# Patient Record
Sex: FEMALE | Employment: UNEMPLOYED | URBAN - METROPOLITAN AREA
[De-identification: names, ages, dates, MRNs, and addresses within clinical notes are randomized per-mention and may not be internally consistent; named-entity substitution may affect disease eponyms.]

---

## 2025-05-14 ENCOUNTER — HOSPITAL ENCOUNTER (INPATIENT)
Facility: HOSPITAL | Age: OVER 89
LOS: 4 days | Discharge: HOME HEALTH | DRG: 291 | End: 2025-05-18
Attending: STUDENT IN AN ORGANIZED HEALTH CARE EDUCATION/TRAINING PROGRAM | Admitting: INTERNAL MEDICINE
Payer: MEDICARE

## 2025-05-14 ENCOUNTER — APPOINTMENT (OUTPATIENT)
Dept: CARDIOLOGY | Facility: HOSPITAL | Age: OVER 89
DRG: 291 | End: 2025-05-14
Payer: MEDICARE

## 2025-05-14 ENCOUNTER — APPOINTMENT (OUTPATIENT)
Dept: RADIOLOGY | Facility: HOSPITAL | Age: OVER 89
DRG: 291 | End: 2025-05-14
Payer: MEDICARE

## 2025-05-14 DIAGNOSIS — I50.20 UNSPECIFIED SYSTOLIC (CONGESTIVE) HEART FAILURE: ICD-10-CM

## 2025-05-14 DIAGNOSIS — I16.0 HYPERTENSIVE URGENCY: ICD-10-CM

## 2025-05-14 DIAGNOSIS — R06.09 DYSPNEA ON EXERTION: ICD-10-CM

## 2025-05-14 DIAGNOSIS — N17.0 ACUTE KIDNEY FAILURE WITH TUBULAR NECROSIS: ICD-10-CM

## 2025-05-14 DIAGNOSIS — I50.9 ACUTE CONGESTIVE HEART FAILURE, UNSPECIFIED HEART FAILURE TYPE: Primary | ICD-10-CM

## 2025-05-14 LAB
ALBUMIN SERPL BCP-MCNC: 3.4 G/DL (ref 3.4–5)
ALP SERPL-CCNC: 95 U/L (ref 33–136)
ALT SERPL W P-5'-P-CCNC: 12 U/L (ref 7–45)
ANION GAP SERPL CALC-SCNC: 12 MMOL/L (ref 10–20)
AORTIC VALVE PEAK VELOCITY: 1.9 M/S
AST SERPL W P-5'-P-CCNC: 22 U/L (ref 9–39)
AV PEAK GRADIENT: 14 MMHG
BASE EXCESS BLDV CALC-SCNC: 3.2 MMOL/L (ref -2–3)
BASOPHILS # BLD AUTO: 0.06 X10*3/UL (ref 0–0.1)
BASOPHILS NFR BLD AUTO: 0.9 %
BILIRUB SERPL-MCNC: 0.9 MG/DL (ref 0–1.2)
BNP SERPL-MCNC: 2107 PG/ML (ref 0–99)
BODY TEMPERATURE: 37 DEGREES CELSIUS
BUN SERPL-MCNC: 13 MG/DL (ref 6–23)
CALCIUM SERPL-MCNC: 8.8 MG/DL (ref 8.6–10.3)
CARDIAC TROPONIN I PNL SERPL HS: 89 NG/L (ref 0–13)
CARDIAC TROPONIN I PNL SERPL HS: 90 NG/L (ref 0–13)
CHLORIDE SERPL-SCNC: 107 MMOL/L (ref 98–107)
CO2 SERPL-SCNC: 28 MMOL/L (ref 21–32)
CREAT SERPL-MCNC: 1.22 MG/DL (ref 0.5–1.05)
EGFRCR SERPLBLD CKD-EPI 2021: 40 ML/MIN/1.73M*2
EJECTION FRACTION APICAL 4 CHAMBER: 74.3
EJECTION FRACTION: 68 %
EOSINOPHIL # BLD AUTO: 0.03 X10*3/UL (ref 0–0.4)
EOSINOPHIL NFR BLD AUTO: 0.5 %
ERYTHROCYTE [DISTWIDTH] IN BLOOD BY AUTOMATED COUNT: 14.6 % (ref 11.5–14.5)
FLUAV RNA RESP QL NAA+PROBE: NOT DETECTED
FLUBV RNA RESP QL NAA+PROBE: NOT DETECTED
GLUCOSE SERPL-MCNC: 140 MG/DL (ref 74–99)
HCO3 BLDV-SCNC: 28.5 MMOL/L (ref 22–26)
HCT VFR BLD AUTO: 35.2 % (ref 36–46)
HGB BLD-MCNC: 11 G/DL (ref 12–16)
IMM GRANULOCYTES # BLD AUTO: 0.01 X10*3/UL (ref 0–0.5)
IMM GRANULOCYTES NFR BLD AUTO: 0.2 % (ref 0–0.9)
INHALED O2 CONCENTRATION: 21 %
LEFT ATRIUM VOLUME AREA LENGTH INDEX BSA: 72.1 ML/M2
LEFT VENTRICLE INTERNAL DIMENSION DIASTOLE: 3.58 CM (ref 3.5–6)
LYMPHOCYTES # BLD AUTO: 1.46 X10*3/UL (ref 0.8–3)
LYMPHOCYTES NFR BLD AUTO: 22.4 %
MCH RBC QN AUTO: 29.8 PG (ref 26–34)
MCHC RBC AUTO-ENTMCNC: 31.3 G/DL (ref 32–36)
MCV RBC AUTO: 95 FL (ref 80–100)
MONOCYTES # BLD AUTO: 0.44 X10*3/UL (ref 0.05–0.8)
MONOCYTES NFR BLD AUTO: 6.7 %
NEUTROPHILS # BLD AUTO: 4.53 X10*3/UL (ref 1.6–5.5)
NEUTROPHILS NFR BLD AUTO: 69.3 %
NRBC BLD-RTO: 0 /100 WBCS (ref 0–0)
OXYHGB MFR BLDV: 66.3 % (ref 45–75)
PCO2 BLDV: 45 MM HG (ref 41–51)
PH BLDV: 7.41 PH (ref 7.33–7.43)
PLATELET # BLD AUTO: 293 X10*3/UL (ref 150–450)
PO2 BLDV: 40 MM HG (ref 35–45)
POTASSIUM SERPL-SCNC: 3.5 MMOL/L (ref 3.5–5.3)
PROT SERPL-MCNC: 6.6 G/DL (ref 6.4–8.2)
RBC # BLD AUTO: 3.69 X10*6/UL (ref 4–5.2)
RIGHT VENTRICLE FREE WALL PEAK S': 14.5 CM/S
RIGHT VENTRICLE PEAK SYSTOLIC PRESSURE: 61.4 MMHG
SAO2 % BLDV: 68 % (ref 45–75)
SARS-COV-2 RNA RESP QL NAA+PROBE: NOT DETECTED
SODIUM SERPL-SCNC: 143 MMOL/L (ref 136–145)
TRICUSPID ANNULAR PLANE SYSTOLIC EXCURSION: 1.9 CM
WBC # BLD AUTO: 6.5 X10*3/UL (ref 4.4–11.3)

## 2025-05-14 PROCEDURE — 82805 BLOOD GASES W/O2 SATURATION: CPT | Performed by: PHYSICIAN ASSISTANT

## 2025-05-14 PROCEDURE — 83880 ASSAY OF NATRIURETIC PEPTIDE: CPT | Performed by: PHYSICIAN ASSISTANT

## 2025-05-14 PROCEDURE — 2500000004 HC RX 250 GENERAL PHARMACY W/ HCPCS (ALT 636 FOR OP/ED): Mod: JZ | Performed by: PHYSICIAN ASSISTANT

## 2025-05-14 PROCEDURE — 93306 TTE W/DOPPLER COMPLETE: CPT

## 2025-05-14 PROCEDURE — 2500000004 HC RX 250 GENERAL PHARMACY W/ HCPCS (ALT 636 FOR OP/ED)

## 2025-05-14 PROCEDURE — 80053 COMPREHEN METABOLIC PANEL: CPT | Performed by: PHYSICIAN ASSISTANT

## 2025-05-14 PROCEDURE — 84484 ASSAY OF TROPONIN QUANT: CPT | Performed by: PHYSICIAN ASSISTANT

## 2025-05-14 PROCEDURE — 96374 THER/PROPH/DIAG INJ IV PUSH: CPT

## 2025-05-14 PROCEDURE — 1100000001 HC PRIVATE ROOM DAILY

## 2025-05-14 PROCEDURE — 71045 X-RAY EXAM CHEST 1 VIEW: CPT | Performed by: RADIOLOGY

## 2025-05-14 PROCEDURE — 99285 EMERGENCY DEPT VISIT HI MDM: CPT | Mod: 25 | Performed by: STUDENT IN AN ORGANIZED HEALTH CARE EDUCATION/TRAINING PROGRAM

## 2025-05-14 PROCEDURE — 36415 COLL VENOUS BLD VENIPUNCTURE: CPT | Performed by: STUDENT IN AN ORGANIZED HEALTH CARE EDUCATION/TRAINING PROGRAM

## 2025-05-14 PROCEDURE — 87636 SARSCOV2 & INF A&B AMP PRB: CPT | Performed by: PHYSICIAN ASSISTANT

## 2025-05-14 PROCEDURE — 36415 COLL VENOUS BLD VENIPUNCTURE: CPT | Performed by: PHYSICIAN ASSISTANT

## 2025-05-14 PROCEDURE — 93005 ELECTROCARDIOGRAM TRACING: CPT

## 2025-05-14 PROCEDURE — 84484 ASSAY OF TROPONIN QUANT: CPT | Performed by: STUDENT IN AN ORGANIZED HEALTH CARE EDUCATION/TRAINING PROGRAM

## 2025-05-14 PROCEDURE — 2500000001 HC RX 250 WO HCPCS SELF ADMINISTERED DRUGS (ALT 637 FOR MEDICARE OP)

## 2025-05-14 PROCEDURE — 71045 X-RAY EXAM CHEST 1 VIEW: CPT

## 2025-05-14 PROCEDURE — 96375 TX/PRO/DX INJ NEW DRUG ADDON: CPT

## 2025-05-14 PROCEDURE — 2500000004 HC RX 250 GENERAL PHARMACY W/ HCPCS (ALT 636 FOR OP/ED): Performed by: STUDENT IN AN ORGANIZED HEALTH CARE EDUCATION/TRAINING PROGRAM

## 2025-05-14 PROCEDURE — 99291 CRITICAL CARE FIRST HOUR: CPT | Mod: 25 | Performed by: STUDENT IN AN ORGANIZED HEALTH CARE EDUCATION/TRAINING PROGRAM

## 2025-05-14 PROCEDURE — 85025 COMPLETE CBC W/AUTO DIFF WBC: CPT | Performed by: PHYSICIAN ASSISTANT

## 2025-05-14 PROCEDURE — 84443 ASSAY THYROID STIM HORMONE: CPT | Performed by: INTERNAL MEDICINE

## 2025-05-14 RX ORDER — FUROSEMIDE 10 MG/ML
20 INJECTION INTRAMUSCULAR; INTRAVENOUS ONCE
Status: COMPLETED | OUTPATIENT
Start: 2025-05-14 | End: 2025-05-14

## 2025-05-14 RX ORDER — HYDRALAZINE HYDROCHLORIDE 20 MG/ML
5 INJECTION INTRAMUSCULAR; INTRAVENOUS EVERY 6 HOURS PRN
Status: DISCONTINUED | OUTPATIENT
Start: 2025-05-14 | End: 2025-05-18 | Stop reason: HOSPADM

## 2025-05-14 RX ORDER — METOPROLOL TARTRATE 50 MG/1
50 TABLET ORAL 2 TIMES DAILY
Status: DISCONTINUED | OUTPATIENT
Start: 2025-05-14 | End: 2025-05-17

## 2025-05-14 RX ORDER — FUROSEMIDE 10 MG/ML
20 INJECTION INTRAMUSCULAR; INTRAVENOUS DAILY
Status: DISCONTINUED | OUTPATIENT
Start: 2025-05-15 | End: 2025-05-18 | Stop reason: HOSPADM

## 2025-05-14 RX ORDER — SIMVASTATIN 80 MG/1
80 TABLET, FILM COATED ORAL NIGHTLY
COMMUNITY
Start: 2025-02-12 | End: 2025-05-18 | Stop reason: HOSPADM

## 2025-05-14 RX ORDER — ACETAMINOPHEN 325 MG/1
650 TABLET ORAL EVERY 6 HOURS PRN
Status: DISCONTINUED | OUTPATIENT
Start: 2025-05-14 | End: 2025-05-18 | Stop reason: HOSPADM

## 2025-05-14 RX ORDER — ACETAMINOPHEN 160 MG/5ML
650 SOLUTION ORAL EVERY 6 HOURS PRN
Status: DISCONTINUED | OUTPATIENT
Start: 2025-05-14 | End: 2025-05-18 | Stop reason: HOSPADM

## 2025-05-14 RX ORDER — METOPROLOL TARTRATE 50 MG/1
50 TABLET ORAL 2 TIMES DAILY
COMMUNITY
Start: 2025-01-23 | End: 2025-05-18 | Stop reason: HOSPADM

## 2025-05-14 RX ORDER — POLYETHYLENE GLYCOL 3350 17 G/17G
17 POWDER, FOR SOLUTION ORAL DAILY
Status: DISCONTINUED | OUTPATIENT
Start: 2025-05-14 | End: 2025-05-18 | Stop reason: HOSPADM

## 2025-05-14 RX ORDER — AMLODIPINE BESYLATE 10 MG/1
10 TABLET ORAL DAILY
Status: ON HOLD | COMMUNITY
Start: 2025-03-10 | End: 2025-05-18

## 2025-05-14 RX ORDER — NETARSUDIL AND LATANOPROST OPHTHALMIC SOLUTION, 0.02%/0.005% .2; .05 MG/ML; MG/ML
1 SOLUTION/ DROPS OPHTHALMIC; TOPICAL NIGHTLY
COMMUNITY
Start: 2025-01-02 | End: 2025-05-18 | Stop reason: HOSPADM

## 2025-05-14 RX ORDER — ENOXAPARIN SODIUM 100 MG/ML
40 INJECTION SUBCUTANEOUS EVERY 24 HOURS
Status: DISCONTINUED | OUTPATIENT
Start: 2025-05-14 | End: 2025-05-14 | Stop reason: ALTCHOICE

## 2025-05-14 RX ORDER — ACETAMINOPHEN 650 MG/1
650 SUPPOSITORY RECTAL EVERY 6 HOURS PRN
Status: DISCONTINUED | OUTPATIENT
Start: 2025-05-14 | End: 2025-05-18 | Stop reason: HOSPADM

## 2025-05-14 RX ORDER — LABETALOL HYDROCHLORIDE 5 MG/ML
20 INJECTION, SOLUTION INTRAVENOUS ONCE
Status: COMPLETED | OUTPATIENT
Start: 2025-05-14 | End: 2025-05-14

## 2025-05-14 RX ORDER — HEPARIN SODIUM 5000 [USP'U]/ML
5000 INJECTION, SOLUTION INTRAVENOUS; SUBCUTANEOUS EVERY 12 HOURS
Status: DISCONTINUED | OUTPATIENT
Start: 2025-05-14 | End: 2025-05-18 | Stop reason: HOSPADM

## 2025-05-14 RX ORDER — ZOLPIDEM TARTRATE 10 MG/1
1 TABLET ORAL NIGHTLY
COMMUNITY
Start: 2024-12-18 | End: 2025-05-18 | Stop reason: HOSPADM

## 2025-05-14 RX ORDER — AMLODIPINE BESYLATE 10 MG/1
10 TABLET ORAL DAILY
Status: DISCONTINUED | OUTPATIENT
Start: 2025-05-14 | End: 2025-05-18 | Stop reason: HOSPADM

## 2025-05-14 RX ORDER — DORZOLAMIDE HYDROCHLORIDE AND TIMOLOL MALEATE 20; 5 MG/ML; MG/ML
1 SOLUTION/ DROPS OPHTHALMIC EVERY 12 HOURS
COMMUNITY
Start: 2025-01-02 | End: 2025-05-18 | Stop reason: HOSPADM

## 2025-05-14 RX ADMIN — METOPROLOL TARTRATE 50 MG: 50 TABLET, FILM COATED ORAL at 12:53

## 2025-05-14 RX ADMIN — FUROSEMIDE 20 MG: 10 INJECTION, SOLUTION INTRAVENOUS at 12:16

## 2025-05-14 RX ADMIN — AMLODIPINE BESYLATE 10 MG: 10 TABLET ORAL at 15:56

## 2025-05-14 RX ADMIN — HYDRALAZINE HYDROCHLORIDE 5 MG: 20 INJECTION INTRAMUSCULAR; INTRAVENOUS at 21:22

## 2025-05-14 RX ADMIN — HYDRALAZINE HYDROCHLORIDE 5 MG: 20 INJECTION INTRAMUSCULAR; INTRAVENOUS at 12:53

## 2025-05-14 RX ADMIN — METOPROLOL TARTRATE 50 MG: 50 TABLET, FILM COATED ORAL at 20:15

## 2025-05-14 RX ADMIN — HEPARIN SODIUM 5000 UNITS: 5000 INJECTION, SOLUTION INTRAVENOUS; SUBCUTANEOUS at 14:26

## 2025-05-14 RX ADMIN — LABETALOL HYDROCHLORIDE 20 MG: 5 INJECTION INTRAVENOUS at 11:41

## 2025-05-14 SDOH — SOCIAL STABILITY: SOCIAL INSECURITY
WITHIN THE LAST YEAR, HAVE YOU BEEN KICKED, HIT, SLAPPED, OR OTHERWISE PHYSICALLY HURT BY YOUR PARTNER OR EX-PARTNER?: NO

## 2025-05-14 SDOH — SOCIAL STABILITY: SOCIAL INSECURITY: HAVE YOU HAD THOUGHTS OF HARMING ANYONE ELSE?: NO

## 2025-05-14 SDOH — SOCIAL STABILITY: SOCIAL INSECURITY: DOES ANYONE TRY TO KEEP YOU FROM HAVING/CONTACTING OTHER FRIENDS OR DOING THINGS OUTSIDE YOUR HOME?: NO

## 2025-05-14 SDOH — SOCIAL STABILITY: SOCIAL INSECURITY
WITHIN THE LAST YEAR, HAVE YOU BEEN RAPED OR FORCED TO HAVE ANY KIND OF SEXUAL ACTIVITY BY YOUR PARTNER OR EX-PARTNER?: NO

## 2025-05-14 SDOH — ECONOMIC STABILITY: INCOME INSECURITY: IN THE PAST 12 MONTHS HAS THE ELECTRIC, GAS, OIL, OR WATER COMPANY THREATENED TO SHUT OFF SERVICES IN YOUR HOME?: NO

## 2025-05-14 SDOH — SOCIAL STABILITY: SOCIAL INSECURITY: WERE YOU ABLE TO COMPLETE ALL THE BEHAVIORAL HEALTH SCREENINGS?: YES

## 2025-05-14 SDOH — SOCIAL STABILITY: SOCIAL INSECURITY: WITHIN THE LAST YEAR, HAVE YOU BEEN AFRAID OF YOUR PARTNER OR EX-PARTNER?: NO

## 2025-05-14 SDOH — SOCIAL STABILITY: SOCIAL INSECURITY: WITHIN THE LAST YEAR, HAVE YOU BEEN HUMILIATED OR EMOTIONALLY ABUSED IN OTHER WAYS BY YOUR PARTNER OR EX-PARTNER?: NO

## 2025-05-14 SDOH — ECONOMIC STABILITY: FOOD INSECURITY: WITHIN THE PAST 12 MONTHS, THE FOOD YOU BOUGHT JUST DIDN'T LAST AND YOU DIDN'T HAVE MONEY TO GET MORE.: NEVER TRUE

## 2025-05-14 SDOH — SOCIAL STABILITY: SOCIAL INSECURITY: HAVE YOU HAD ANY THOUGHTS OF HARMING ANYONE ELSE?: NO

## 2025-05-14 SDOH — ECONOMIC STABILITY: FOOD INSECURITY: WITHIN THE PAST 12 MONTHS, YOU WORRIED THAT YOUR FOOD WOULD RUN OUT BEFORE YOU GOT THE MONEY TO BUY MORE.: NEVER TRUE

## 2025-05-14 SDOH — SOCIAL STABILITY: SOCIAL INSECURITY: ABUSE: ADULT

## 2025-05-14 SDOH — SOCIAL STABILITY: SOCIAL INSECURITY: DO YOU FEEL ANYONE HAS EXPLOITED OR TAKEN ADVANTAGE OF YOU FINANCIALLY OR OF YOUR PERSONAL PROPERTY?: NO

## 2025-05-14 SDOH — SOCIAL STABILITY: SOCIAL INSECURITY: DO YOU FEEL UNSAFE GOING BACK TO THE PLACE WHERE YOU ARE LIVING?: NO

## 2025-05-14 SDOH — SOCIAL STABILITY: SOCIAL INSECURITY: ARE YOU OR HAVE YOU BEEN THREATENED OR ABUSED PHYSICALLY, EMOTIONALLY, OR SEXUALLY BY ANYONE?: NO

## 2025-05-14 SDOH — SOCIAL STABILITY: SOCIAL INSECURITY: HAS ANYONE EVER THREATENED TO HURT YOUR FAMILY OR YOUR PETS?: NO

## 2025-05-14 SDOH — SOCIAL STABILITY: SOCIAL INSECURITY: ARE THERE ANY APPARENT SIGNS OF INJURIES/BEHAVIORS THAT COULD BE RELATED TO ABUSE/NEGLECT?: NO

## 2025-05-14 ASSESSMENT — ACTIVITIES OF DAILY LIVING (ADL)
BATHING: NEEDS ASSISTANCE
HEARING - LEFT EAR: DIFFICULTY WITH NOISE
DRESSING YOURSELF: NEEDS ASSISTANCE
ADEQUATE_TO_COMPLETE_ADL: YES
HEARING - RIGHT EAR: DIFFICULTY WITH NOISE
GROOMING: INDEPENDENT
JUDGMENT_ADEQUATE_SAFELY_COMPLETE_DAILY_ACTIVITIES: YES
PATIENT'S MEMORY ADEQUATE TO SAFELY COMPLETE DAILY ACTIVITIES?: YES
FEEDING YOURSELF: INDEPENDENT
WALKS IN HOME: NEEDS ASSISTANCE
TOILETING: NEEDS ASSISTANCE
LACK_OF_TRANSPORTATION: NO

## 2025-05-14 ASSESSMENT — COGNITIVE AND FUNCTIONAL STATUS - GENERAL
STANDING UP FROM CHAIR USING ARMS: A LITTLE
DAILY ACTIVITIY SCORE: 21
TOILETING: A LITTLE
MOVING FROM LYING ON BACK TO SITTING ON SIDE OF FLAT BED WITH BEDRAILS: A LITTLE
CLIMB 3 TO 5 STEPS WITH RAILING: A LOT
MOVING TO AND FROM BED TO CHAIR: A LITTLE
HELP NEEDED FOR BATHING: A LITTLE
DRESSING REGULAR LOWER BODY CLOTHING: A LITTLE
TURNING FROM BACK TO SIDE WHILE IN FLAT BAD: A LITTLE
MOBILITY SCORE: 17
PATIENT BASELINE BEDBOUND: NO
WALKING IN HOSPITAL ROOM: A LITTLE

## 2025-05-14 ASSESSMENT — COLUMBIA-SUICIDE SEVERITY RATING SCALE - C-SSRS
2. HAVE YOU ACTUALLY HAD ANY THOUGHTS OF KILLING YOURSELF?: NO
1. IN THE PAST MONTH, HAVE YOU WISHED YOU WERE DEAD OR WISHED YOU COULD GO TO SLEEP AND NOT WAKE UP?: NO
6. HAVE YOU EVER DONE ANYTHING, STARTED TO DO ANYTHING, OR PREPARED TO DO ANYTHING TO END YOUR LIFE?: NO
6. HAVE YOU EVER DONE ANYTHING, STARTED TO DO ANYTHING, OR PREPARED TO DO ANYTHING TO END YOUR LIFE?: NO
1. IN THE PAST MONTH, HAVE YOU WISHED YOU WERE DEAD OR WISHED YOU COULD GO TO SLEEP AND NOT WAKE UP?: NO
2. HAVE YOU ACTUALLY HAD ANY THOUGHTS OF KILLING YOURSELF?: NO

## 2025-05-14 ASSESSMENT — LIFESTYLE VARIABLES
AUDIT-C TOTAL SCORE: 0
HOW OFTEN DO YOU HAVE A DRINK CONTAINING ALCOHOL: NEVER
AUDIT-C TOTAL SCORE: 0
PRESCIPTION_ABUSE_PAST_12_MONTHS: NO
SUBSTANCE_ABUSE_PAST_12_MONTHS: NO
HOW MANY STANDARD DRINKS CONTAINING ALCOHOL DO YOU HAVE ON A TYPICAL DAY: PATIENT DOES NOT DRINK
HOW OFTEN DO YOU HAVE 6 OR MORE DRINKS ON ONE OCCASION: NEVER
SKIP TO QUESTIONS 9-10: 1

## 2025-05-14 ASSESSMENT — PATIENT HEALTH QUESTIONNAIRE - PHQ9
1. LITTLE INTEREST OR PLEASURE IN DOING THINGS: NOT AT ALL
SUM OF ALL RESPONSES TO PHQ9 QUESTIONS 1 & 2: 0
2. FEELING DOWN, DEPRESSED OR HOPELESS: NOT AT ALL

## 2025-05-14 ASSESSMENT — PAIN SCALES - GENERAL
PAINLEVEL_OUTOF10: 0 - NO PAIN
PAINLEVEL_OUTOF10: 0 - NO PAIN

## 2025-05-14 ASSESSMENT — PAIN - FUNCTIONAL ASSESSMENT: PAIN_FUNCTIONAL_ASSESSMENT: 0-10

## 2025-05-14 NOTE — ED TRIAGE NOTES
Pt presents via EMS from home c/o shortness of breath for the past two months. Pt denies cough, fevers. Pt reports heaviness to sternal region of chest. EMS states pt was 90% on RA. Pt denies home O2 use. Pt 92% on 2L NC on arrival. PT /110 for EMS. PT denies headache.

## 2025-05-14 NOTE — ED PROVIDER NOTES
Limitations to History: none  External Records Reviewed  Independent Historians: self  Social determinants affecting care: none    HPI  Nae Samayoa is a 100 y.o. female with significant past medical history of hypertension who presents to the emergency department due to shortness of breath for the last 2 months.  She has not had any cough or congestion.  She denies any fever or chills.  She denies chest pains or peripheral edema.  She denies any nausea, vomiting, diarrhea.  She sometimes feels lightheaded and dizzy. She denies any headache.  She did not take her morning antihypertensives.  She lives at home with her son.  She has no further complaints.    [facility]  Medical History[1] reviewed by myself.    Meds  Current Outpatient Medications   Medication Instructions    amLODIPine (NORVASC) 10 mg, Daily    dorzolamide-timoloL (Cosopt) 22.3-6.8 mg/mL ophthalmic solution 1 drop, Every 12 hours    metoprolol tartrate (LOPRESSOR) 50 mg, oral, 2 times daily    Rocklatan 0.02-0.005 % drops 1 drop, Both Eyes, Nightly    simvastatin (ZOCOR) 80 mg, oral, Nightly    zolpidem (Ambien) 10 mg tablet 1 tablet, oral, Nightly       Allergies  RX Allergies[2] reviewed by myself.    SHx  Social History[3] reviewed by myself.      ------------------------------------------------------------------------------------------------------------------------------------------    BP (!) 192/104 (BP Location: Left arm, Patient Position: Sitting)   Pulse 74   Temp 36.2 °C (97.2 °F) (Tympanic)   Resp 20   Wt 45.4 kg (100 lb)   SpO2 95%     Physical Exam  Vitals and nursing note reviewed.   Constitutional:       General: She is not in acute distress.     Appearance: Normal appearance. She is well-developed and normal weight. She is not ill-appearing or toxic-appearing.   HENT:      Head: Normocephalic.      Nose: Nose normal.      Mouth/Throat:      Mouth: Mucous membranes are moist.   Eyes:      Extraocular Movements: Extraocular movements intact.       Conjunctiva/sclera: Conjunctivae normal.   Cardiovascular:      Rate and Rhythm: Normal rate and regular rhythm.   Pulmonary:      Effort: Pulmonary effort is normal.      Breath sounds: Decreased breath sounds present.   Abdominal:      General: Abdomen is flat.      Palpations: Abdomen is soft.      Tenderness: There is no abdominal tenderness. There is no guarding or rebound.   Musculoskeletal:         General: Normal range of motion.      Cervical back: Neck supple.      Right lower leg: No edema.      Left lower leg: No edema.   Skin:     General: Skin is warm and dry.   Neurological:      Mental Status: She is alert and oriented to person, place, and time.   Psychiatric:         Attention and Perception: Attention normal.         Mood and Affect: Mood normal.          ------------------------------------------------------------------------------------------------------------------------------------------  Labs  Labs Reviewed   CBC WITH AUTO DIFFERENTIAL - Abnormal       Result Value    WBC 6.5      nRBC 0.0      RBC 3.69 (*)     Hemoglobin 11.0 (*)     Hematocrit 35.2 (*)     MCV 95      MCH 29.8      MCHC 31.3 (*)     RDW 14.6 (*)     Platelets 293      Neutrophils % 69.3      Immature Granulocytes %, Automated 0.2      Lymphocytes % 22.4      Monocytes % 6.7      Eosinophils % 0.5      Basophils % 0.9      Neutrophils Absolute 4.53      Immature Granulocytes Absolute, Automated 0.01      Lymphocytes Absolute 1.46      Monocytes Absolute 0.44      Eosinophils Absolute 0.03      Basophils Absolute 0.06     COMPREHENSIVE METABOLIC PANEL - Abnormal    Glucose 140 (*)     Sodium 143      Potassium 3.5      Chloride 107      Bicarbonate 28      Anion Gap 12      Urea Nitrogen 13      Creatinine 1.22 (*)     eGFR 40 (*)     Calcium 8.8      Albumin 3.4      Alkaline Phosphatase 95      Total Protein 6.6      AST 22      Bilirubin, Total 0.9      ALT 12     TROPONIN I, HIGH SENSITIVITY - Abnormal    Troponin I,  High Sensitivity 89 (*)     Narrative:     Less than 99th percentile of normal range cutoff-  Female and children under 18 years old <14 ng/L; Male <21 ng/L: Negative  Repeat testing should be performed if clinically indicated.     Female and children under 18 years old 14-50 ng/L; Male 21-50 ng/L:  Consistent with possible cardiac damage and possible increased clinical   risk. Serial measurements may help to assess extent of myocardial damage.     >50 ng/L: Consistent with cardiac damage, increased clinical risk and  myocardial infarction. Serial measurements may help assess extent of   myocardial damage.      NOTE: Children less than 1 year old may have higher baseline troponin   levels and results should be interpreted in conjunction with the overall   clinical context.     NOTE: Troponin I testing is performed using a different   testing methodology at [facility] than at other   [facility] hospitals. Direct result comparisons should only   be made within the same method.   B-TYPE NATRIURETIC PEPTIDE - Abnormal    BNP 2,107 (*)     Narrative:        <100 pg/mL - Heart failure unlikely  100-299 pg/mL - Intermediate probability of acute heart                  failure exacerbation. Correlate with clinical                  context and patient history.    >=300 pg/mL - Heart Failure likely. Correlate with clinical                  context and patient history.    BNP testing is performed using different testing methodology at [facility] than at other [facility]. Direct result comparisons should only be made within the same method.      BLOOD GAS VENOUS - Abnormal    POCT pH, Venous 7.41      POCT pCO2, Venous 45      POCT pO2, Venous 40      POCT SO2, Venous 68      POCT Oxy Hemoglobin, Venous 66.3      POCT Base Excess, Venous 3.2 (*)     POCT HCO3 Calculated, Venous 28.5 (*)     Patient Temperature 37.0      FiO2 21     TROPONIN I, HIGH SENSITIVITY - Abnormal    Troponin I, High  Sensitivity 90 (*)     Narrative:     Less than 99th percentile of normal range cutoff-  Female and children under 18 years old <14 ng/L; Male <21 ng/L: Negative  Repeat testing should be performed if clinically indicated.     Female and children under 18 years old 14-50 ng/L; Male 21-50 ng/L:  Consistent with possible cardiac damage and possible increased clinical   risk. Serial measurements may help to assess extent of myocardial damage.     >50 ng/L: Consistent with cardiac damage, increased clinical risk and  myocardial infarction. Serial measurements may help assess extent of   myocardial damage.      NOTE: Children less than 1 year old may have higher baseline troponin   levels and results should be interpreted in conjunction with the overall   clinical context.     NOTE: Troponin I testing is performed using a different   testing methodology at [facility] than at other   [facility]. Direct result comparisons should only   be made within the same method.   SARS-COV-2 AND INFLUENZA A/B PCR - Normal    Flu A Result Not Detected      Flu B Result Not Detected      Coronavirus 2019, PCR Not Detected      Narrative:     This assay is an FDA-cleared, in vitro diagnostic nucleic acid amplification test for the qualitative detection and differentiation of SARS CoV-2/ Influenza A/B from nasopharyngeal specimens collected from individuals with signs and symptoms of respiratory tract infections, and has been validated for use at [facility]. Negative results do not preclude COVID-19/ Influenza A/B infections and should not be used as the sole basis for diagnosis, treatment, or other management decisions. Testing for SARS CoV-2 is recommended only for patients who meet current clinical and/or epidemiological criteria defined by federal, state, or local public health directives.        Imaging  XR chest 1 view   Final Result   Patchy bilateral perihilar and basilar infiltrates and  probable small   effusions.        MACRO:   none        Signed by: Jan Michaels 5/14/2025 10:29 AM   Dictation workstation:   [id number]           ED Course  Diagnoses as of 05/14/25 1246   Acute congestive heart failure, unspecified heart failure type   Hypertensive urgency        Medical Decision Making: She did not appear ill or toxic.  Vital signs reviewed.  She is hypertensive.  She is 90% on room air.  Nursing staff placed her on 2 L of oxygen via nasal cannula and improved to oxygenation to 94%.  She was placed in continuous cardiac and pulse ox monitor.  Comprehensive workup initiated for her shortness of breath.  Nursing staff initially having a hard time getting blood pressure readings on her arms and legs.  Will have her continue to monitor this and do manual blood pressure.    Differential diagnoses considered: Congestive heart failure, pneumonia, pleural effusions, ACS, pulmonary hypertension, others    Medications given: IV labetalol, IV Lasix    EKG interpreted by myself and ED attending: Normal sinus rhythm.  Ventricular rate 86 bpm.  No acute ST elevations.  T wave inversions in the inferior leads.  Slight ST sloping in V4 through V6.    Manual blood pressure 262 over 110 mmHg.  Will treat with IV labetalol    I reviewed the workup from today.  No leukocytosis or leukopenia.  Hemoglobin 11 with hematocrit of 35.2.  Platelets normal.  BUN 13 with creatinine 1.22.  VBG showing a normal pH.  COVID influenza negative.  BNP elevated to 107.  Troponin 89.  Second troponin 90.  Chest x-ray showing patchy bilateral perihilar and basilar infiltrates and probable small effusions.  Patient will be diuresed with IV Lasix.  Blood pressure has improved to 192/104.  She will need to be admitted.  I consulted her PCP.  Dr. Winter is covering.  I spoke with Dr. Winter who will admit.  House VERONICA notified.  Case discussed and evaluated with the attending who is agreeable to patient plan of care.    Diagnosis:  Congestive heart failure, hypertensive urgency  Plan: Admit       [1] No past medical history on file.  [2] No Known Allergies  [3]         Raman Brooks PA-C  05/14/25 [id number]

## 2025-05-14 NOTE — H&P
History Of Present Illness  Nae Samayoa is a 100 y.o. female with past medical history of hypertension who presents to the emergency department for evaluation of shortness of breath. Patient is alert and oriented x 3 at the time my interview.  She states that she has been experiencing shortness of breath that is, gradually since January.  She states that it is worse at night when she is lying flat.  She states that occasionally she will wake up in the middle of the night gasping for air.  Denies use of pillows to prop her up or sleeping in recliner.  She also states that she has had decreased appetite.  Patient otherwise denies any complaints, denies fever/chills, URI symptoms, sick contacts, history of DVT/PE, tobacco use, alcohol use, recent travel/recent surgery, leg swelling, chest pain, abdominal pain, nausea/vomiting, urinary symptoms.  She does not use oxygen at home.  Patient states she lives at home with her son and does not use any assistive ambulatory devices.  She states she has been taking her medicines every day, but did not take her antihypertensives this morning.  She states she does not take her blood pressure at home.  Patient and I have a lengthy discussion regarding CODE STATUS.  I explained in detail to patient full code, DNR CCA and DNRCC.  Patient expresses understanding and would like to be made DNR CC.    ED course: On arrival, patient's /133, heart rate 89, respirations 18, afebrile, saturating 94% on 2 L NC.  Patient's BP now down to 192/104 after labetalol.  Labs and imaging performed, revealing glucose 140, creatinine 1.22 and BUN 13 (1.29 and 33 on 10/3/2022).  LFTs WNL.  BNP elevated at 2,107.  Initial troponin 89.  No white count.  Hemoglobin decreased at 11.  Platelets WNL.  Flu A/B/COVID-19 swabs negative.  VBG shows normal pH 7.41 with normal pCO2 at 45, and slightly elevated HCO3 at 28.5.  Chest x-ray shows patchy bilateral perihilar and basilar infiltrates and probable small  effusions.  EKG, per ED physician, normal sinus rhythm with ventricular rate 86 bpm.  No acute ST elevations.  T wave inversions in the inferior leads.  Slight ST sloping in V4 through V6.  Patient given IV labetalol 20 mg and 20 mg IV Lasix in the ED.  Patient will be admitted inpatient under Dr. Winter.     Past Medical History  Medical History[1]    Surgical History  Surgical History[2]     Social History  She has no history on file for tobacco use, alcohol use, and drug use.    Family History  Family History[3]     Allergies  Patient has no known allergies.    Review of Systems  Negative except as stated above in HPI.    Physical Exam  Constitutional:       Comments: Patient is asleep when I arrive, but easily arousable.  Alert and oriented x 3.   HENT:      Head: Normocephalic and atraumatic.      Nose: Nose normal.      Mouth/Throat:      Mouth: Mucous membranes are dry.      Pharynx: Oropharynx is clear.   Eyes:      Extraocular Movements: Extraocular movements intact.      Conjunctiva/sclera: Conjunctivae normal.      Pupils: Pupils are equal, round, and reactive to light.   Cardiovascular:      Rate and Rhythm: Normal rate and regular rhythm.      Pulses: Normal pulses.   Pulmonary:      Effort: Pulmonary effort is normal.      Breath sounds: Normal breath sounds. No wheezing or rales.   Abdominal:      General: Abdomen is flat. Bowel sounds are normal.      Palpations: Abdomen is soft.      Tenderness: There is no abdominal tenderness.   Musculoskeletal:         General: Normal range of motion.      Right lower leg: Edema (1+) present.      Left lower leg: Edema (1+) present.   Skin:     General: Skin is warm and dry.   Neurological:      General: No focal deficit present.      Mental Status: She is alert and oriented to person, place, and time.   Psychiatric:         Mood and Affect: Mood normal.         Behavior: Behavior normal.         Thought Content: Thought content normal.          Last Recorded  Vitals  Blood pressure (!) 221/94, pulse 78, temperature 36.2 °C (97.2 °F), resp. rate 18, weight 45.4 kg (100 lb), SpO2 98%.    Relevant Results    Scheduled medications  Scheduled Medications[4]  Continuous medications  Continuous Medications[5]  PRN medications  PRN Medications[6]    Results for orders placed or performed during the hospital encounter of 05/14/25 (from the past 24 hours)   Sars-CoV-2 and Influenza A/B PCR   Result Value Ref Range    Flu A Result Not Detected Not Detected    Flu B Result Not Detected Not Detected    Coronavirus 2019, PCR Not Detected Not Detected   CBC and Auto Differential   Result Value Ref Range    WBC 6.5 4.4 - 11.3 x10*3/uL    nRBC 0.0 0.0 - 0.0 /100 WBCs    RBC 3.69 (L) 4.00 - 5.20 x10*6/uL    Hemoglobin 11.0 (L) 12.0 - 16.0 g/dL    Hematocrit 35.2 (L) 36.0 - 46.0 %    MCV 95 80 - 100 fL    MCH 29.8 26.0 - 34.0 pg    MCHC 31.3 (L) 32.0 - 36.0 g/dL    RDW 14.6 (H) 11.5 - 14.5 %    Platelets 293 150 - 450 x10*3/uL    Neutrophils % 69.3 40.0 - 80.0 %    Immature Granulocytes %, Automated 0.2 0.0 - 0.9 %    Lymphocytes % 22.4 13.0 - 44.0 %    Monocytes % 6.7 2.0 - 10.0 %    Eosinophils % 0.5 0.0 - 6.0 %    Basophils % 0.9 0.0 - 2.0 %    Neutrophils Absolute 4.53 1.60 - 5.50 x10*3/uL    Immature Granulocytes Absolute, Automated 0.01 0.00 - 0.50 x10*3/uL    Lymphocytes Absolute 1.46 0.80 - 3.00 x10*3/uL    Monocytes Absolute 0.44 0.05 - 0.80 x10*3/uL    Eosinophils Absolute 0.03 0.00 - 0.40 x10*3/uL    Basophils Absolute 0.06 0.00 - 0.10 x10*3/uL   Comprehensive metabolic panel   Result Value Ref Range    Glucose 140 (H) 74 - 99 mg/dL    Sodium 143 136 - 145 mmol/L    Potassium 3.5 3.5 - 5.3 mmol/L    Chloride 107 98 - 107 mmol/L    Bicarbonate 28 21 - 32 mmol/L    Anion Gap 12 10 - 20 mmol/L    Urea Nitrogen 13 6 - 23 mg/dL    Creatinine 1.22 (H) 0.50 - 1.05 mg/dL    eGFR 40 (L) >60 mL/min/1.73m*2    Calcium 8.8 8.6 - 10.3 mg/dL    Albumin 3.4 3.4 - 5.0 g/dL    Alkaline  Phosphatase 95 33 - 136 U/L    Total Protein 6.6 6.4 - 8.2 g/dL    AST 22 9 - 39 U/L    Bilirubin, Total 0.9 0.0 - 1.2 mg/dL    ALT 12 7 - 45 U/L   Troponin I, High Sensitivity   Result Value Ref Range    Troponin I, High Sensitivity 89 (HH) 0 - 13 ng/L   B-Type Natriuretic Peptide   Result Value Ref Range    BNP 2,107 (H) 0 - 99 pg/mL   Blood Gas Venous   Result Value Ref Range    POCT pH, Venous 7.41 7.33 - 7.43 pH    POCT pCO2, Venous 45 41 - 51 mm Hg    POCT pO2, Venous 40 35 - 45 mm Hg    POCT SO2, Venous 68 45 - 75 %    POCT Oxy Hemoglobin, Venous 66.3 45.0 - 75.0 %    POCT Base Excess, Venous 3.2 (H) -2.0 - 3.0 mmol/L    POCT HCO3 Calculated, Venous 28.5 (H) 22.0 - 26.0 mmol/L    Patient Temperature 37.0 degrees Celsius    FiO2 21 %   Troponin I, High Sensitivity   Result Value Ref Range    Troponin I, High Sensitivity 90 (HH) 0 - 13 ng/L   Transthoracic Echo Complete   Result Value Ref Range    AV pk camacho 1.90 m/s    LA vol index A/L 72.1 ml/m2    Tricuspid annular plane systolic excursion 1.9 cm    LV EF 68 %    RV free wall pk S' 14.50 cm/s    LVIDd 3.58 cm    RVSP 61.4 mmHg    AV pk grad 14 mmHg    LV A4C EF 74.3      Transthoracic Echo Complete  Result Date: 5/14/2025   [facility], [address] [place] [address]           Tel [phone] and Fax [phone] TRANSTHORACIC ECHOCARDIOGRAM REPORT  Patient Name:       OLIVIA KITA          Mateo Physician:    [id number] Michael Fung MD Study Date:         5/14/2025           Ordering Provider:    [id number] BERNAL MRN/PID:            [id number]            Fellow: Accession#:         [id number]        Nurse: Date of Birth/Age:  12/26/1924 / 100    Sonographer:          Teresa faustin RDCS Gender assigned at  F                   Additional Staff: Birth: Height:                                  Admit Date:           5/14/2025 Weight:             45.36 kg            Admission Status:     Inpatient -                                                               Routine BSA / BMI:          1.27 m2 / kg/m2     Encounter#:           [id number] Blood Pressure:     258/105 mmHg        Department Location:  [facility] Study Type:    TRANSTHORACIC ECHO (TTE) COMPLETE Diagnosis/ICD: Unspecified systolic (congestive) heart failure (CHF)-I50.20 Indication:    SOB CPT Code:      Echo Complete w Full Doppler-[id number] Patient History: Pertinent History: HTN. Study Detail: The following Echo studies were performed: 2D, M-Mode, Doppler and               color flow. Technically challenging study due to patient lying in               supine position.  PHYSICIAN INTERPRETATION: Left Ventricle: Left ventricular ejection fraction is normal, by visual estimate at 65-70%. There is severely increased concentric left ventricular hypertrophy. There are no regional left ventricular wall motion abnormalities. The left ventricular cavity size is normal. Left ventricular diastolic filling is indeterminate. Left Atrium: The left atrial size is moderately dilated. Right Ventricle: The right ventricle is normal in size. There is normal right ventricular global systolic function. Right Atrium: The right atrium is normal in size. Aortic Valve: The aortic valve is trileaflet. There is mild aortic valve cusp calcification. There is trace aortic valve regurgitation. The peak instantaneous gradient of the aortic valve is 14 mmHg. Mitral Valve: The mitral valve is normal in structure. There is mild mitral annular calcification. There is moderate mitral valve regurgitation. Tricuspid Valve: The tricuspid valve is structurally normal. There is moderate tricuspid regurgitation. The Doppler estimated RVSP is moderate to severely elevated at 61.4 mmHg. Pulmonic Valve: The pulmonic valve is not well visualized. The pulmonic  valve regurgitation was not well visualized. Pericardium: There is no pericardial effusion noted. Aorta: The aortic root is normal.  CONCLUSIONS:  1. There is severely increased concentric left ventricular hypertrophy.  2. Left ventricular ejection fraction is normal, by visual estimate at 65-70%.  3. Moderate mitral valve regurgitation.  4. Moderate tricuspid regurgitation visualized.  5. Moderate to severely elevated right ventricular systolic pressure. QUANTITATIVE DATA SUMMARY:  2D MEASUREMENTS:          Normal Ranges: Ao Root d:       2.80 cm  (2.0-3.7cm) LAs:             4.50 cm  (2.7-4.0cm) IVSd:            1.92 cm  (0.6-1.1cm) LVPWd:           1.88 cm  (0.6-1.1cm) LVIDd:           3.58 cm  (3.9-5.9cm) LVIDs:           2.57 cm LV Mass Index:   233 g/m2 LVEDV Index:     29 ml/m2 LV % FS          28.2 %  LEFT ATRIUM:                  Normal Ranges: LA Vol A4C:        88.4 ml    (22+/-6mL/m2) LA Vol A2C:        89.3 ml LA Vol BP:         91.7 ml LA Vol Index A4C:  69.5ml/m2 LA Vol Index A2C:  70.2 ml/m2 LA Vol Index BP:   72.1 ml/m2 LA Area A4C:       25.8 cm2 LA Area A2C:       25.1 cm2 LA Major Axis A4C: 6.4 cm LA Major Axis A2C: 6.0 cm LA Vol A4C:        86.1 ml LA Vol A2C:        80.8 ml LA Vol Index BSA:  65.6 ml/m2  RIGHT ATRIUM:          Normal Ranges: RA Area A4C:  12.3 cm2  M-MODE MEASUREMENTS:         Normal Ranges: Ao Root:             3.00 cm (2.0-3.7cm) LAs:                 5.10 cm (2.7-4.0cm)  AORTA MEASUREMENTS:         Normal Ranges: Asc Ao, d:          2.90 cm (2.1-3.4cm)  LV SYSTOLIC FUNCTION:                      Normal Ranges: EF-A4C View:    74 % (>=55%) EF-A2C View:    82 % EF-Biplane:     81 % EF-Visual:      68 % LV EF Reported: 68 %  LV DIASTOLIC FUNCTION:           Normal Ranges: MV Peak A:             0.61 m/s  (0.42-0.7 m/s) MV e'                  0.061 m/s (>8.0) MV lateral e'          0.06 m/s MV medial e'           0.06 m/s  MITRAL VALVE:          Normal Ranges: MV DT:        156  msec (150-240msec)  MITRAL INSUFFICIENCY:             Normal Ranges: MR Vmax:              599.50 cm/s  AORTIC VALVE:           Normal Ranges: AoV Vmax:     1.90 m/s  (<=1.7m/s) AoV Peak P.4 mmHg (<20mmHg) LVOT Max Delta: 0.92 m/s  (<=1.1m/s) LVOT VTI:     15.50 cm  RIGHT VENTRICLE: RV Basal 3.06 cm RV Mid   1.89 cm RV Major 5.4 cm TAPSE:   19.1 mm RV s'    0.14 m/s  TRICUSPID VALVE/RVSP:          Normal Ranges: Peak TR Velocity:     3.82 m/s RV Syst Pressure:     61 mmHg  (< 30mmHg)  [id number] Michael Fung MD Electronically signed on 2025 at 2:56:22 PM  ** Final **     XR chest 1 view  Result Date: 2025  Interpreted By:  Jan Michaels, STUDY: XR CHEST 1 VIEW; 2025 10:20 am   INDICATION: CLINICAL INFORMATION: Signs/Symptoms:sob.   COMPARISON: None   ACCESSION NUMBER(S): [id number]   ORDERING CLINICIAN: DEQUAN PRIDE   TECHNIQUE: Portable chest one view.   FINDINGS: The cardiac size is indeterminate in view of the AP projection. Patchy bilateral perihilar and basilar infiltrates are present along with suspicious small effusions.       Patchy bilateral perihilar and basilar infiltrates and probable small effusions.   MACRO: none   Signed by: Jan Michaels 2025 10:29 AM Dictation workstation:   [id number]       Assessment & Plan  Acute congestive heart failure, unspecified heart failure type      New onset congestive heart failure  Shortness of breath  Elevated BNP  Elevated high-sensitivity troponins  Hypertensive urgency  - Cardiology consult, recommendations appreciated  - Chest x-ray shows patchy bilateral perihilar and basilar infiltrates and probable small effusions  - Initial troponin 89, delta ordered and pending.  Trend  - EKG, per ED physician, normal sinus rhythm with ventricular rate 86 bpm.  No acute ST elevations.  T wave inversions in the inferior leads.  Slight ST sloping in V4 through V6  - Patient hypertensive to 253/133 in the ED, states that she has been taking her home  metoprolol every day, though did miss her dose this a.m.   -Given IV labetalol in the ED  - Continue home metoprolol, IV hydralazine as needed  - Continue with IV Lasix 20mg daily, adjustments per attending/cardio, pt is diuretic naïve   - Echo added on  - I's&O's, daily weights, heart failure education    Elevated Cr  Anemia  - Unclear baseline, Cr 1.22 today  - Hgb 11.0 today  - Monitor with BMP/CBC    DVT Prophylaxis  - subQ heparin  - SCDs    I spent 60 minutes in the professional and overall care of this patient.      Johny Winter DO         [1] No past medical history on file.  [2] No past surgical history on file.  [3] No family history on file.  [4] amLODIPine, 10 mg, oral, Daily  [START ON 5/15/2025] furosemide, 20 mg, intravenous, Daily  heparin, 5,000 Units, subcutaneous, q12h  metoprolol tartrate, 50 mg, oral, BID  perflutren lipid microspheres, 0.5-10 mL of dilution, intravenous, Once in imaging  perflutren protein A microsphere, 0.5 mL, intravenous, Once in imaging  polyethylene glycol, 17 g, oral, Daily  sulfur hexafluoride microsphr, 2 mL, intravenous, Once in imaging     [5]    [6] PRN medications: acetaminophen **OR** acetaminophen **OR** acetaminophen, hydrALAZINE, oxygen

## 2025-05-14 NOTE — H&P
History Of Present Illness  Nae Samayoa is a 100 y.o. female with past medical history of hypertension who presents to the emergency department for evaluation of shortness of breath. Patient is alert and oriented x 3 at the time my interview.  She states that she has been experiencing shortness of breath that is, gradually since January.  She states that it is worse at night when she is lying flat.  She states that occasionally she will wake up in the middle of the night gasping for air.  Denies use of pillows to prop her up or sleeping in recliner.  She also states that she has had decreased appetite.  Patient otherwise denies any complaints, denies fever/chills, URI symptoms, sick contacts, history of DVT/PE, tobacco use, alcohol use, recent travel/recent surgery, leg swelling, chest pain, abdominal pain, nausea/vomiting, urinary symptoms.  She does not use oxygen at home.  Patient states she lives at home with her son and does not use any assistive ambulatory devices.  She states she has been taking her medicines every day, but did not take her antihypertensives this morning.  She states she does not take her blood pressure at home.  Patient and I have a lengthy discussion regarding CODE STATUS.  I explained in detail to patient full code, DNR CCA and DNRCC.  Patient expresses understanding and would like to be made DNR CC.    ED course: On arrival, patient's /133, heart rate 89, respirations 18, afebrile, saturating 94% on 2 L NC.  Patient's BP now down to 192/104 after labetalol.  Labs and imaging performed, revealing glucose 140, creatinine 1.22 and BUN 13 (1.29 and 33 on 10/3/2022).  LFTs WNL.  BNP elevated at 2,107.  Initial troponin 89.  No white count.  Hemoglobin decreased at 11.  Platelets WNL.  Flu A/B/COVID-19 swabs negative.  VBG shows normal pH 7.41 with normal pCO2 at 45, and slightly elevated HCO3 at 28.5.  Chest x-ray shows patchy bilateral perihilar and basilar infiltrates and probable small  effusions.  EKG, per ED physician, normal sinus rhythm with ventricular rate 86 bpm.  No acute ST elevations.  T wave inversions in the inferior leads.  Slight ST sloping in V4 through V6.  Patient given IV labetalol 20 mg and 20 mg IV Lasix in the ED.  Patient will be admitted inpatient under Dr. Winter.     Past Medical History  Medical History[1]    Surgical History  Surgical History[2]     Social History  She has no history on file for tobacco use, alcohol use, and drug use.    Family History  Family History[3]     Allergies  Patient has no known allergies.    Review of Systems  Negative except as stated above in HPI.    Physical Exam  Constitutional:       Comments: Patient is asleep when I arrive, but easily arousable.  Alert and oriented x 3.   HENT:      Head: Normocephalic and atraumatic.      Nose: Nose normal.      Mouth/Throat:      Mouth: Mucous membranes are dry.      Pharynx: Oropharynx is clear.   Eyes:      Extraocular Movements: Extraocular movements intact.      Conjunctiva/sclera: Conjunctivae normal.      Pupils: Pupils are equal, round, and reactive to light.   Cardiovascular:      Rate and Rhythm: Normal rate and regular rhythm.      Pulses: Normal pulses.   Pulmonary:      Effort: Pulmonary effort is normal.      Breath sounds: Normal breath sounds. No wheezing or rales.   Abdominal:      General: Abdomen is flat. Bowel sounds are normal.      Palpations: Abdomen is soft.      Tenderness: There is no abdominal tenderness.   Musculoskeletal:         General: Normal range of motion.      Right lower leg: Edema (1+) present.      Left lower leg: Edema (1+) present.   Skin:     General: Skin is warm and dry.   Neurological:      General: No focal deficit present.      Mental Status: She is alert and oriented to person, place, and time.   Psychiatric:         Mood and Affect: Mood normal.         Behavior: Behavior normal.         Thought Content: Thought content normal.          Last Recorded  Vitals  Blood pressure (!) 258/105, pulse 79, temperature 36.2 °C (97.2 °F), temperature source Tympanic, resp. rate (!) 56, weight 45.4 kg (100 lb), SpO2 96%.    Relevant Results    Scheduled medications  Scheduled Medications[4]  Continuous medications  Continuous Medications[5]  PRN medications  PRN Medications[6]    Results for orders placed or performed during the hospital encounter of 05/14/25 (from the past 24 hours)   Sars-CoV-2 and Influenza A/B PCR   Result Value Ref Range    Flu A Result Not Detected Not Detected    Flu B Result Not Detected Not Detected    Coronavirus 2019, PCR Not Detected Not Detected   CBC and Auto Differential   Result Value Ref Range    WBC 6.5 4.4 - 11.3 x10*3/uL    nRBC 0.0 0.0 - 0.0 /100 WBCs    RBC 3.69 (L) 4.00 - 5.20 x10*6/uL    Hemoglobin 11.0 (L) 12.0 - 16.0 g/dL    Hematocrit 35.2 (L) 36.0 - 46.0 %    MCV 95 80 - 100 fL    MCH 29.8 26.0 - 34.0 pg    MCHC 31.3 (L) 32.0 - 36.0 g/dL    RDW 14.6 (H) 11.5 - 14.5 %    Platelets 293 150 - 450 x10*3/uL    Neutrophils % 69.3 40.0 - 80.0 %    Immature Granulocytes %, Automated 0.2 0.0 - 0.9 %    Lymphocytes % 22.4 13.0 - 44.0 %    Monocytes % 6.7 2.0 - 10.0 %    Eosinophils % 0.5 0.0 - 6.0 %    Basophils % 0.9 0.0 - 2.0 %    Neutrophils Absolute 4.53 1.60 - 5.50 x10*3/uL    Immature Granulocytes Absolute, Automated 0.01 0.00 - 0.50 x10*3/uL    Lymphocytes Absolute 1.46 0.80 - 3.00 x10*3/uL    Monocytes Absolute 0.44 0.05 - 0.80 x10*3/uL    Eosinophils Absolute 0.03 0.00 - 0.40 x10*3/uL    Basophils Absolute 0.06 0.00 - 0.10 x10*3/uL   Comprehensive metabolic panel   Result Value Ref Range    Glucose 140 (H) 74 - 99 mg/dL    Sodium 143 136 - 145 mmol/L    Potassium 3.5 3.5 - 5.3 mmol/L    Chloride 107 98 - 107 mmol/L    Bicarbonate 28 21 - 32 mmol/L    Anion Gap 12 10 - 20 mmol/L    Urea Nitrogen 13 6 - 23 mg/dL    Creatinine 1.22 (H) 0.50 - 1.05 mg/dL    eGFR 40 (L) >60 mL/min/1.73m*2    Calcium 8.8 8.6 - 10.3 mg/dL    Albumin 3.4  3.4 - 5.0 g/dL    Alkaline Phosphatase 95 33 - 136 U/L    Total Protein 6.6 6.4 - 8.2 g/dL    AST 22 9 - 39 U/L    Bilirubin, Total 0.9 0.0 - 1.2 mg/dL    ALT 12 7 - 45 U/L   Troponin I, High Sensitivity   Result Value Ref Range    Troponin I, High Sensitivity 89 (HH) 0 - 13 ng/L   B-Type Natriuretic Peptide   Result Value Ref Range    BNP 2,107 (H) 0 - 99 pg/mL   Blood Gas Venous   Result Value Ref Range    POCT pH, Venous 7.41 7.33 - 7.43 pH    POCT pCO2, Venous 45 41 - 51 mm Hg    POCT pO2, Venous 40 35 - 45 mm Hg    POCT SO2, Venous 68 45 - 75 %    POCT Oxy Hemoglobin, Venous 66.3 45.0 - 75.0 %    POCT Base Excess, Venous 3.2 (H) -2.0 - 3.0 mmol/L    POCT HCO3 Calculated, Venous 28.5 (H) 22.0 - 26.0 mmol/L    Patient Temperature 37.0 degrees Celsius    FiO2 21 %   Troponin I, High Sensitivity   Result Value Ref Range    Troponin I, High Sensitivity 90 (HH) 0 - 13 ng/L     XR chest 1 view  Result Date: 5/14/2025  Interpreted By:  Jan Michaels, STUDY: XR CHEST 1 VIEW; 5/14/2025 10:20 am   INDICATION: CLINICAL INFORMATION: Signs/Symptoms:sob.   COMPARISON: None   ACCESSION NUMBER(S): [id number]   ORDERING CLINICIAN: DEQUAN PRIDE   TECHNIQUE: Portable chest one view.   FINDINGS: The cardiac size is indeterminate in view of the AP projection. Patchy bilateral perihilar and basilar infiltrates are present along with suspicious small effusions.       Patchy bilateral perihilar and basilar infiltrates and probable small effusions.   MACRO: none   Signed by: Jan Michaels 5/14/2025 10:29 AM Dictation workstation:   [id number]       Assessment & Plan  Acute congestive heart failure, unspecified heart failure type      New onset congestive heart failure  Shortness of breath  Elevated BNP  Elevated high-sensitivity troponins  Hypertensive urgency  - Cardiology consult, recommendations appreciated  - Chest x-ray shows patchy bilateral perihilar and basilar infiltrates and probable small effusions  - Initial troponin  89, delta ordered and pending.  Trend  - EKG, per ED physician, normal sinus rhythm with ventricular rate 86 bpm.  No acute ST elevations.  T wave inversions in the inferior leads.  Slight ST sloping in V4 through V6  - Patient hypertensive to 253/133 in the ED, states that she has been taking her home metoprolol every day, though did miss her dose this a.m.   -Given IV labetalol in the ED  - Continue home metoprolol, IV hydralazine as needed  - Continue with IV Lasix 20mg daily, adjustments per attending/cardio, pt is diuretic naïve   - Echo added on  - I's&O's, daily weights, heart failure education    Elevated Cr  Anemia  - Unclear baseline, Cr 1.22 today  - Hgb 11.0 today  - Monitor with BMP/CBC    DVT Prophylaxis  - subQ heparin  - SCDs    I spent 60 minutes in the professional and overall care of this patient.      Mag Allen PA-C         [1] No past medical history on file.  [2] No past surgical history on file.  [3] No family history on file.  [4] enoxaparin, 40 mg, subcutaneous, q24h  [START ON 5/15/2025] furosemide, 20 mg, intravenous, Daily  metoprolol tartrate, 50 mg, oral, BID  perflutren lipid microspheres, 0.5-10 mL of dilution, intravenous, Once in imaging  perflutren protein A microsphere, 0.5 mL, intravenous, Once in imaging  polyethylene glycol, 17 g, oral, Daily  sulfur hexafluoride microsphr, 2 mL, intravenous, Once in imaging  [5]    [6] PRN medications: acetaminophen **OR** acetaminophen **OR** acetaminophen, hydrALAZINE, oxygen

## 2025-05-14 NOTE — PROGRESS NOTES
Pharmacy Medication History Review    Nae Samayoa is a 100 y.o. female admitted for No Principal Problem: There is no principal problem currently on the Problem List. Please update the Problem List and refresh.. Pharmacy reviewed the patient's [id number] medications and allergies for accuracy.    The list below reflectives the updated PTA list. Please review each medication in order reconciliation for additional clarification and justification.  Prior to Admission medications    Medication Sig Start Date End Date Taking? Authorizing Provider   amLODIPine (Norvasc) 10 mg tablet Take 1 tablet (10 mg) by mouth once daily.  Patient not taking: Reported on 5/14/2025 3/10/25  no Historical Provider, MD   dorzolamide-timoloL (Cosopt) 22.3-6.8 mg/mL ophthalmic solution 1 drop every 12 hours. 1/2/25  no Historical Provider, MD   metoprolol tartrate (Lopressor) 50 mg tablet Take 1 tablet by mouth 2 times a day. 1/23/25  Yes Historical Provider, MD   [place] 0.02-0.005 % drops Administer 1 drop into both eyes once daily at bedtime. 1/2/25  no Historical Provider, MD   simvastatin (Zocor) 80 mg tablet Take 1 tablet (80 mg) by mouth once daily at bedtime. 2/12/25  no Historical Provider, MD   zolpidem (Ambien) 10 mg tablet Take 1 tablet (10 mg) by mouth once daily at bedtime. 12/18/24  Yes Historical Provider, MD        The list below reflectives the updated allergy list. Please review each documented allergy for additional clarification and justification.  Allergies  Reviewed by Mag Falk RN on 5/14/2025   No Known Allergies         Below are additional concerns with the patient's PTA list.      Doretha Echevarria

## 2025-05-14 NOTE — CONSULTS
Cardiology Consult    Impression:  HFpEF.  Acute decompensated.  Uncontrolled hypertension  Hyperlipidemia  CKD 3  Plan:  Agree with resuming metoprolol.  Agree with IV Lasix  If BP remains high, Norvasc may be resumed.  Follow labs  CC  Shortness of breath  HPI:  100-year-old woman presenting to ER for evaluation of shortness of breath.  She describes a 3-month history of slowly progressive exertional shortness of breath.  She tells me 3 months ago all of her blood pressure medications were stopped.  She only takes a sleeping pill.  In the last week she has developed orthopnea.  No lower extremity swelling.  No chest pain.  No palpitations.  No presyncope or syncope.  Last night she was struggling to breathe so she came to ER.  Oxygen saturations were 94% on 2 L.  Presenting blood pressure was 240/100.  Chest x-ray showed pulmonary venous congestion.  BNP 2107.  Troponins not significantly elevated.  Creatinine 1.2.  EKG was not available for my review.  She was given IV labetalol, IV hydralazine and IV Lasix.  Breathing has improved.  Blood pressure is better.  Meds:  Scheduled medications  Scheduled Medications[1]  Continuous medications  Continuous Medications[2]  PRN medications  PRN Medications[3]    PMHx:  As listed above  Social history:  No cigarettes or alcohol  Family history:  Noncontributory  Review of systems:  See HPI  Physical exam:  Vitals:    25 1400   BP: (!) 236/102   Pulse: 78   Resp: (!) 22   Temp:    SpO2: 98%      JVP +10.  Carotid upstroke normal.  Heart sounds normal.  Apical pansystolic murmur.  Chest exam reveals decreased breath sounds at the bases.  Abdomen soft.  No edema.  EK-lead EKG not available for review.  Telemetry shows sinus rhythm.  Echo:  Severe LVH.  Hyperdynamic LV.  EF 65 to 70%.  Moderate MR.  Moderate TR.  RVSP 61  Labs:  Lab Results   Component Value Date    WBC 6.5 2025    HGB 11.0 (L) 2025    HCT 35.2 (L) 2025     2025     CHOL 167 08/03/2020    TRIG 151 (H) 08/03/2020    HDL 46.9 08/03/2020    ALT 12 05/14/2025    AST 22 05/14/2025     05/14/2025    K 3.5 05/14/2025     05/14/2025    CREATININE 1.22 (H) 05/14/2025    BUN 13 05/14/2025    CO2 28 05/14/2025    HGBA1C 6.2 08/03/2020     par        [1] [START ON 5/15/2025] furosemide, 20 mg, intravenous, Daily  heparin, 5,000 Units, subcutaneous, q12h  metoprolol tartrate, 50 mg, oral, BID  perflutren lipid microspheres, 0.5-10 mL of dilution, intravenous, Once in imaging  perflutren protein A microsphere, 0.5 mL, intravenous, Once in imaging  polyethylene glycol, 17 g, oral, Daily  sulfur hexafluoride microsphr, 2 mL, intravenous, Once in imaging  [2]    [3] PRN medications: acetaminophen **OR** acetaminophen **OR** acetaminophen, hydrALAZINE, oxygen

## 2025-05-14 NOTE — CARE PLAN
The clinical goals for the shift include maintain safety and comfort      Problem: Pain - Adult  Goal: Verbalizes/displays adequate comfort level or baseline comfort level  Outcome: Progressing     Problem: Safety - Adult  Goal: Free from fall injury  Outcome: Progressing     Problem: Discharge Planning  Goal: Discharge to home or other facility with appropriate resources  Outcome: Progressing     Problem: Chronic Conditions and Co-morbidities  Goal: Patient's chronic conditions and co-morbidity symptoms are monitored and maintained or improved  Outcome: Progressing     Problem: Nutrition  Goal: Nutrient intake appropriate for maintaining nutritional needs  Outcome: Progressing     Problem: Skin  Goal: Decreased wound size/increased tissue granulation at next dressing change  Outcome: Progressing  Goal: Participates in plan/prevention/treatment measures  Outcome: Progressing  Goal: Prevent/manage excess moisture  Outcome: Progressing  Goal: Prevent/minimize sheer/friction injuries  Outcome: Progressing  Goal: Promote/optimize nutrition  Outcome: Progressing  Goal: Promote skin healing  Outcome: Progressing     Problem: Heart Failure  Goal: Improved gas exchange this shift  Outcome: Progressing  Goal: Improved urinary output this shift  Outcome: Progressing  Goal: Reduction in peripheral edema within 24 hours  Outcome: Progressing  Goal: Report improvement of dyspnea/breathlessness this shift  Outcome: Progressing  Goal: Weight from fluid excess reduced over 2-3 days, then stabilize  Outcome: Progressing  Goal: Increase self care and/or family involvement in 24 hours  Outcome: Progressing

## 2025-05-14 NOTE — NURSING NOTE
"Heart Failure Nurse Navigator      Assessment    I met with Nae Estebanolya at the bedside    Patients Cardiologist(s): none    Patients Primary Care Provider:    1. Medical Domain  What is the patient's most recent LVEF? unkown  HFrEF (LVEF <= 40%) Quadruple therapy recommended  HFmrEF (LVEF 41-49%) Quadruple therapy recommended  HFpEF (LVEF >= 50%) Minimum recommendations: SGLT2i and MRA  Is the patient on OP GDMT for their condition?   ARNI/ACEI/ARB: No None  BB: Yes Metoprolol tartrate 50 mg BID  MRA: No None  SGLT2i: No None  Is the patient prescribed a diuretic? No  None  Does this patient have an implanted device (ie cardioMEMS, ICD, CRT-D)?  Device type:   Could this patient have advanced heart failure (Stage D heart failure)?: No   If yes, the potential markers of advanced heart failure include:     REFERENCE: Potential markers of advanced HF   Inotrope (dobutamine or milrinone) used during this admission?   LVEF<=25%?   2.   >=2 hospitalizations for ADHF in the last year?   3.   Severe symptoms of HF (fatigue, dyspnea, confusion, edema) despite medical therapy?   4.  Downtitration of GDMT as compared to home medications?   5.  Discontinuation of GDMT because of hypotension or renal intolerance?   6.  Recurrent arrhythmias (AF, VT with ICD shocks)?   7.  Cardiac cachexia (i.e., unintentional weight loss due to HF)?   8.  High-risk biomarker profile (e.g., hyponatremia [Na<135], very elevated BNP, worsening kidney function)   9.  Escalating doses of diuretics or persistent edema despite escalation     2. Mind and Emotion  Does this patient have possible cognitive impairment?: No (The Mini-Cog score )  Ask the patient to memorize these 3 words: banana, sunrise, chair  Ask the patient to draw a clock with hands pointing at \"20 minutes after 8\"  Ask the patient to recall the 3 words  Score: Add number of words recalled + clock drawing (0 points for any errors, 2 points if correct)  Interpretation: A score of 0-2 " suggests cognitive impairment is present, a score of 3-5 suggests cognitive impairment is absent  Does this patient have major depression?: No (PH-Q2 score )  Over the last 2 weeks: Little interest or pleasure in doing things? (Not at all +0, Several days +1, More than half the days +2, Nearly every day +3)  Over the last 2 weeks: Feeling down, depressed or hopeless? (Not at all +0, Several days +1, More than half the days +2, Nearly every day +3)  Score: Add points  Interpretation: A score of 3 or more suggests that major depression is likely.     3. Physical Function  Could this patient be frail?: Yes   Defined by presence of all of these: slowness, weakness, shrinking, inactivity, exhaustion  Is this patient at risk for falling?: Yes   Defined by having experienced a fall in the last 12 months.    4. Social Determinants of Health  Transportation deficits?: No   Lack of insurance?: No   Living conditions (homelessness, unstable home)?: No   Poor family/social support?: No     I provided the following heart failure education:  - Heart Failure education packet provided.  - HF signs and symptoms, heart failure zones and when to call cardiologist.   - Controlling Heart Failure at Home: medication adherence, following up with cardiologist at least once yearly, staying healthy and active, limiting sodium and fluid intake as directed by cardiologist.  - Daily Weight Education  -Low Sodium Diet Education  -Fluid Restriction Education      *Discharge Appointment Follow-up Plan:        Additional Comments: Discussed CHF signs and symptoms and when to call cardiologist. Discussed low sodium diet and foods to avoid. CHF materials left for patient and family to review.

## 2025-05-15 LAB
ANION GAP SERPL CALC-SCNC: 12 MMOL/L (ref 10–20)
BUN SERPL-MCNC: 12 MG/DL (ref 6–23)
CALCIUM SERPL-MCNC: 8.2 MG/DL (ref 8.6–10.3)
CHLORIDE SERPL-SCNC: 105 MMOL/L (ref 98–107)
CO2 SERPL-SCNC: 30 MMOL/L (ref 21–32)
CREAT SERPL-MCNC: 1.17 MG/DL (ref 0.5–1.05)
EGFRCR SERPLBLD CKD-EPI 2021: 42 ML/MIN/1.73M*2
ERYTHROCYTE [DISTWIDTH] IN BLOOD BY AUTOMATED COUNT: 14.7 % (ref 11.5–14.5)
GLUCOSE SERPL-MCNC: 103 MG/DL (ref 74–99)
HCT VFR BLD AUTO: 32.2 % (ref 36–46)
HGB BLD-MCNC: 10.1 G/DL (ref 12–16)
MCH RBC QN AUTO: 29.7 PG (ref 26–34)
MCHC RBC AUTO-ENTMCNC: 31.4 G/DL (ref 32–36)
MCV RBC AUTO: 95 FL (ref 80–100)
NRBC BLD-RTO: 0 /100 WBCS (ref 0–0)
PLATELET # BLD AUTO: 269 X10*3/UL (ref 150–450)
POTASSIUM SERPL-SCNC: 3.2 MMOL/L (ref 3.5–5.3)
RBC # BLD AUTO: 3.4 X10*6/UL (ref 4–5.2)
SODIUM SERPL-SCNC: 144 MMOL/L (ref 136–145)
TSH SERPL-ACNC: 2.02 MIU/L (ref 0.44–3.98)
WBC # BLD AUTO: 5.9 X10*3/UL (ref 4.4–11.3)

## 2025-05-15 PROCEDURE — 97165 OT EVAL LOW COMPLEX 30 MIN: CPT | Mod: GO

## 2025-05-15 PROCEDURE — 2500000002 HC RX 250 W HCPCS SELF ADMINISTERED DRUGS (ALT 637 FOR MEDICARE OP, ALT 636 FOR OP/ED): Performed by: INTERNAL MEDICINE

## 2025-05-15 PROCEDURE — 2500000004 HC RX 250 GENERAL PHARMACY W/ HCPCS (ALT 636 FOR OP/ED): Mod: JZ

## 2025-05-15 PROCEDURE — 36415 COLL VENOUS BLD VENIPUNCTURE: CPT | Performed by: INTERNAL MEDICINE

## 2025-05-15 PROCEDURE — 2500000001 HC RX 250 WO HCPCS SELF ADMINISTERED DRUGS (ALT 637 FOR MEDICARE OP): Performed by: INTERNAL MEDICINE

## 2025-05-15 PROCEDURE — 97161 PT EVAL LOW COMPLEX 20 MIN: CPT | Mod: GP

## 2025-05-15 PROCEDURE — 1100000001 HC PRIVATE ROOM DAILY

## 2025-05-15 PROCEDURE — 85027 COMPLETE CBC AUTOMATED: CPT | Performed by: INTERNAL MEDICINE

## 2025-05-15 PROCEDURE — 2500000001 HC RX 250 WO HCPCS SELF ADMINISTERED DRUGS (ALT 637 FOR MEDICARE OP)

## 2025-05-15 PROCEDURE — 80048 BASIC METABOLIC PNL TOTAL CA: CPT | Performed by: INTERNAL MEDICINE

## 2025-05-15 RX ORDER — LOSARTAN POTASSIUM 25 MG/1
25 TABLET ORAL DAILY
Status: DISCONTINUED | OUTPATIENT
Start: 2025-05-15 | End: 2025-05-16

## 2025-05-15 RX ORDER — POTASSIUM CHLORIDE 20 MEQ/1
40 TABLET, EXTENDED RELEASE ORAL ONCE
Status: COMPLETED | OUTPATIENT
Start: 2025-05-15 | End: 2025-05-15

## 2025-05-15 RX ADMIN — METOPROLOL TARTRATE 50 MG: 50 TABLET, FILM COATED ORAL at 19:46

## 2025-05-15 RX ADMIN — POLYETHYLENE GLYCOL 3350 17 G: 17 POWDER, FOR SOLUTION ORAL at 08:39

## 2025-05-15 RX ADMIN — LOSARTAN POTASSIUM 25 MG: 25 TABLET, FILM COATED ORAL at 15:14

## 2025-05-15 RX ADMIN — HEPARIN SODIUM 5000 UNITS: 5000 INJECTION, SOLUTION INTRAVENOUS; SUBCUTANEOUS at 15:12

## 2025-05-15 RX ADMIN — METOPROLOL TARTRATE 50 MG: 50 TABLET, FILM COATED ORAL at 08:40

## 2025-05-15 RX ADMIN — AMLODIPINE BESYLATE 10 MG: 10 TABLET ORAL at 08:39

## 2025-05-15 RX ADMIN — HEPARIN SODIUM 5000 UNITS: 5000 INJECTION, SOLUTION INTRAVENOUS; SUBCUTANEOUS at 02:35

## 2025-05-15 RX ADMIN — FUROSEMIDE 20 MG: 10 INJECTION, SOLUTION INTRAMUSCULAR; INTRAVENOUS at 08:39

## 2025-05-15 RX ADMIN — POTASSIUM CHLORIDE 40 MEQ: 1500 TABLET, EXTENDED RELEASE ORAL at 15:11

## 2025-05-15 RX ADMIN — HYDRALAZINE HYDROCHLORIDE 5 MG: 20 INJECTION INTRAMUSCULAR; INTRAVENOUS at 11:46

## 2025-05-15 ASSESSMENT — COGNITIVE AND FUNCTIONAL STATUS - GENERAL
TOILETING: A LITTLE
MOBILITY SCORE: 18
TOILETING: A LOT
DRESSING REGULAR LOWER BODY CLOTHING: A LOT
MOVING TO AND FROM BED TO CHAIR: A LITTLE
DRESSING REGULAR LOWER BODY CLOTHING: A LITTLE
WALKING IN HOSPITAL ROOM: A LITTLE
MOBILITY SCORE: 17
MOVING FROM LYING ON BACK TO SITTING ON SIDE OF FLAT BED WITH BEDRAILS: A LITTLE
HELP NEEDED FOR BATHING: A LITTLE
TURNING FROM BACK TO SIDE WHILE IN FLAT BAD: A LITTLE
DAILY ACTIVITIY SCORE: 17
DAILY ACTIVITIY SCORE: 21
CLIMB 3 TO 5 STEPS WITH RAILING: A LITTLE
TURNING FROM BACK TO SIDE WHILE IN FLAT BAD: A LITTLE
CLIMB 3 TO 5 STEPS WITH RAILING: A LOT
WALKING IN HOSPITAL ROOM: A LITTLE
STANDING UP FROM CHAIR USING ARMS: A LITTLE
MOVING FROM LYING ON BACK TO SITTING ON SIDE OF FLAT BED WITH BEDRAILS: A LITTLE
MOVING TO AND FROM BED TO CHAIR: A LITTLE
DRESSING REGULAR UPPER BODY CLOTHING: A LITTLE
HELP NEEDED FOR BATHING: A LOT
STANDING UP FROM CHAIR USING ARMS: A LITTLE

## 2025-05-15 ASSESSMENT — PAIN SCALES - GENERAL
PAINLEVEL_OUTOF10: 0 - NO PAIN

## 2025-05-15 ASSESSMENT — PAIN - FUNCTIONAL ASSESSMENT
PAIN_FUNCTIONAL_ASSESSMENT: 0-10
PAIN_FUNCTIONAL_ASSESSMENT: 0-10

## 2025-05-15 NOTE — ED PROCEDURE NOTE
Procedure  Critical Care    Performed by: Charly Crawford DO  Authorized by: Charly Crawford DO    Critical care provider statement:     Critical care time (minutes):  47    Critical care time was exclusive of:  Separately billable procedures and treating other patients and teaching time    Critical care was necessary to treat or prevent imminent or life-threatening deterioration of the following conditions: Hypertensive emergency.    Critical care was time spent personally by me on the following activities:  Ordering and performing treatments and interventions, ordering and review of laboratory studies, ordering and review of radiographic studies, pulse oximetry, re-evaluation of patient's condition, review of old charts, examination of patient and evaluation of patient's response to treatment    Care discussed with: admitting provider                 Charly Crawford DO  05/15/25 [id number]

## 2025-05-15 NOTE — PROGRESS NOTES
Occupational Therapy    Evaluation    Patient Name: Nae Samayoa  MRN: [id number]  Today's Date: 5/15/2025  Time Calculation  Start Time: 1113  Stop Time: 1139  Time Calculation (min): 26 min  706/706-A    Assessment  IP OT Assessment  OT Assessment: This hospitalization 5/14/2025: SOB for 2 months.  Patient would benefit from further OT to address ADL's and functional transfers/mobility due to generalized weakness.  Prognosis: Good  End of Session Communication: Bedside nurse  End of Session Patient Position: Up in chair, Alarm on; call-light within reach    Plan:  Treatment Interventions: ADL retraining, Functional transfer training, Endurance training, Patient/family training, Equipment evaluation/education, Compensatory technique education (energy conservation / diaphragmatic breathing techniques training)  OT Frequency: 3 times per week  OT Discharge Recommendations: Low intensity level of continued care  OT - OK to Discharge: Yes to next level of care when medically cleared by physician/medical team    Subjective   Current Problem:  1. Acute congestive heart failure, unspecified heart failure type  Transthoracic Echo Complete    Transthoracic Echo Complete      2. Hypertensive urgency        3. Dyspnea on exertion  Transthoracic Echo Complete    Transthoracic Echo Complete      4. Unspecified systolic (congestive) heart failure  Transthoracic Echo Complete        General:  General  Reason for Referral: ADL, safety assessment  Referred By: Mag Allen PA-C  Past Medical History Relevant to Rehab: CHF, HTN, HLD, CKD  Co-Treatment: PT  Co-Treatment Reason: Co-eval to maximize safety during mobility tasks  Prior to Session Communication: Bedside nurse who confirmed that patient is medically stable to participate in this OT session  Patient Position Received: Bed, 3 rail up, Alarm on  General Comment: cooperative, motivated, pleasant    Precautions:  Hearing/Visual Limitations: [place]  Medical Precautions: Fall  precautions, Oxygen therapy device and L/min (2 1/2 L nasal cannula)  Precautions Comment: UE IV    Pain:  Pain Assessment  Pain Assessment: 0-10  0-10 (Numeric) Pain Score: 0 - No pain    Objective   Cognition:  Overall Cognitive Status: Within Functional Limits     Home Living:  Type of Home: Apartment  Lives With:  (son)  Home Layout: One level  Bathroom Shower/Tub: Tub/shower unit (sits at bottom of tub)  Bathroom Toilet: Standard  Bathroom Equipment: Hand-held shower hose     Prior Function:  Level of [place]: Independent with ADLs and functional transfers  Ambulatory Assistance: Independent (without device)  Prior Function Comments: drives, shops; history of no falls    ADL:  LE Dressing Assistance: Moderate (estimate due to generalized weakness)  ADL Comments: To further address in OT session using assistive techniques/adaptive equipment as needed    Activity Tolerance:  Endurance: Decreased tolerance for upright activites    Bed Mobility/Transfers:  Required instructions/cues for all mobility tasks to promote safety     Bed Mobility  Bed Mobility: Yes  Bed Mobility 1  Bed Mobility 1: Supine to sitting  Level of Assistance 1: Close supervision  Bed Mobility Comments 1: HOB elevated when sat EOB  Transfers  Transfer: Yes  Transfer 1  Transfer From 1: Sit to, Stand to  Transfer to 1: Bed, Chair with arms  Transfer Level of Assistance 1: Contact guard, Minimal verbal cues    Ambulation/Gait Training:  Functional Mobility 1  Device 1: No device  Assistance 1: Minimum assistance  Comments 1: slightly unsteady, ambulated from hospital bed to chair    Sitting Balance:  Static Sitting Balance  Static Sitting-Level of Assistance: Distant supervision    Standing Balance:  Static Standing Balance  Static Standing-Level of Assistance: Contact guard    Sensation:  Light Touch: No apparent deficits    Extremities: RUE   RUE : Within Functional Limits (BUE arthritic changes noted) and LUE   LUE: Within Functional  Limits    Outcome Measures: [facility] Daily Activity  Putting on and taking off regular lower body clothing: A lot  Bathing (including washing, rinsing, drying): A lot  Putting on and taking off regular upper body clothing: A little  Toileting, which includes using toilet, bedpan or urinal: A lot  Taking care of personal grooming such as brushing teeth: None  Eating Meals: None  Daily Activity - Total Score: 17     EDUCATION:  Education Documentation  Mobility Training, taught by Popeye Mercedes OT at 5/15/2025  2:45 PM.  Learner: Patient  Readiness: Acceptance  Method: Explanation  Response: Demonstrated Understanding, Needs Reinforcement    Goals:   Encounter Problems       Encounter Problems (Active)       OT Goals       Patient will complete upper and lower body bathing/dressing; toileting with modified independence using assistive techniques/adaptive equipment as needed  (Progressing)       Start:  05/15/25    Expected End:  05/26/25            Patient will perform bed mobility and functional transfers safely and independently: bed, chair, commode using DME as needed  (Progressing)       Start:  05/15/25    Expected End:  05/26/25            Patient will tolerate standing for 5 mins. and show overall good standing balance during ADL's and functional transfers/mobility  (Progressing)       Start:  05/15/25    Expected End:  05/26/25            Patient will apply energy conservation/diaphragmatic breathing techniques to ADL's and functional transfers with minimal cues  (Progressing)       Start:  05/15/25    Expected End:  05/26/25

## 2025-05-15 NOTE — CARE PLAN
Problem: Safety - Adult  Goal: Free from fall injury  Outcome: Progressing    The clinical goals for the shift include pt to remain free from falls until end of shift

## 2025-05-15 NOTE — PROGRESS NOTES
05/15/25 1114   Discharge Planning   Living Arrangements Children   Support Systems Children   Assistance Needed independent   Type of Residence Private residence   Number of Stairs to Enter Residence 0   Number of Stairs Within Residence 0   Do you have animals or pets at home? No   Who is requesting discharge planning? Provider   Home or Post Acute Services In home services   Type of Home Care Services Home OT;Home PT   Expected Discharge Disposition Home H   Does the patient need discharge transport arranged? No   Intensity of Service   Intensity of Service 0-30 min     Met with patient at bedside. Introduced self and role as care coordinator. Demographics verified. Patient PCP is [facility]. Patient insurance is medicare and [employer]. Patient is independent with ADL's. Patient does not uses any assistive devices for ambulation. Patient denies any home going needs at this time.

## 2025-05-15 NOTE — PROGRESS NOTES
Physical Therapy    Physical Therapy Evaluation    Patient Name: Nae Samayoa  MRN: [id number]  Today's Date: 5/15/2025   Time Calculation  Start Time: 1114  Stop Time: 1129  Time Calculation (min): 15 min  706/706-A    Assessment/Plan   PT Assessment  PT Assessment Results: Decreased endurance, Impaired balance, Decreased mobility  Rehab Prognosis: Good  Evaluation/Treatment Tolerance: Patient tolerated treatment well  Strengths: Ability to acquire knowledge, Capable of completing ADLs semi/independent  Barriers to Participation: Comorbidities  End of Session Communication: Bedside nurse  Assessment Comment: Pt admitted 5/14 with SOB and found to have acute congestive heart failure. Pt demos decreased strength, endurance and balance. Recommend low intensity therapy and initial 24hr sup.  End of Session Patient Position: Up in chair, Alarm on  IP OR SWING BED PT PLAN  Inpatient or Swing Bed: Inpatient  PT Plan  Treatment/Interventions: Bed mobility, Transfer training, Gait training, Balance training, Endurance training, Strengthening, Therapeutic exercise, Therapeutic activity, Home exercise program, Positioning  PT Plan: Ongoing PT  PT Frequency: 3 times per week  PT Discharge Recommendations: Low intensity level of continued care, 24 hr supervision due to cognition  PT - OK to Discharge: Yes    Subjective     Current Problem:  1. Acute congestive heart failure, unspecified heart failure type  Transthoracic Echo Complete    Transthoracic Echo Complete      2. Hypertensive urgency        3. Dyspnea on exertion  Transthoracic Echo Complete    Transthoracic Echo Complete      4. Unspecified systolic (congestive) heart failure  Transthoracic Echo Complete        Problem List[1]    General Visit Information:  General  Reason for Referral: PT eval and treat; impaired mobility  Referred By: Mag Allen PA-C  Past Medical History Relevant to Rehab: CHF, HTN, HLD, CKD  Co-Treatment: OT  Co-Treatment Reason: to maximize  patient safety and participatoin  Prior to Session Communication: Bedside nurse  Patient Position Received: Bed, 3 rail up, Alarm on  General Comment: Pt pleasant and agreeable to therapy    Home Living:  Home Living  Type of Home: Apartment  Lives With:  (son)  Home Layout: One level  Bathroom Shower/Tub: Tub/shower unit (takes baths)  Bathroom Toilet: Standard  Bathroom Equipment: Hand-held shower hose    Prior Level of Function:  Prior Function Per Pt/Caregiver Report  Level of [place]: Independent with ADLs and functional transfers  Ambulatory Assistance: Independent (no device)  Prior Function Comments: drives, shops; history of no falls    Precautions:  Precautions  Hearing/Visual Limitations: [place]  Medical Precautions: Fall precautions (2.5 L nasal cannula)  Precautions Comment: PIV     Objective     Pain:  Pain Assessment  Pain Assessment: 0-10  0-10 (Numeric) Pain Score: 0 - No pain    Cognition:  Cognition  Overall Cognitive Status: Within Functional Limits    General Assessments:      Activity Tolerance  Endurance: Decreased tolerance for upright activites  Sensation  Light Touch: No apparent deficits  Strength  Strength Comments: BLE WFL for age     Functional Assessments:     Bed Mobility  Bed Mobility:  (completed supine to sit with supervision for safety. Denies dizziness)  Transfers  Transfer:  (demos from EOB to no device with CGA for safety. Denies dizziness)  Ambulation/Gait Training  Ambulation/Gait Training Performed:  (completed ~40'x1 with no device and CGA for safety. Demos mild unsteadiness.)     Extremity/Trunk Assessments:     RLE   RLE : Within Functional Limits  LLE   LLE : Within Functional Limits    Outcome Measures:     [facility] Basic Mobility  Turning from your back to your side while in a flat bed without using bedrails: A little  Moving from lying on your back to sitting on the side of a flat bed without using bedrails: A little  Moving to and from bed to chair (including a  wheelchair): A little  Standing up from a chair using your arms (e.g. wheelchair or bedside chair): A little  To walk in hospital room: A little  Climbing 3-5 steps with railing: A little  Basic Mobility - Total Score: 18     Goals:  Encounter Problems       Encounter Problems (Active)       PT Problem       PT Goal 1 STG - Pt will transition supine <> sitting with MOD I  (Progressing)       Start:  05/15/25    Expected End:  05/29/25            PT Goal 2 STG - Pt will transfer STS with MOD I  (Progressing)       Start:  05/15/25    Expected End:  05/29/25            PT Goal 3 STG - Pt will amb 50' using LRD with SBA  (Progressing)       Start:  05/15/25    Expected End:  05/29/25            PT Goal 4 STG - Pt will perform a B LE ther ex program of 2-3 sets of 10  (Progressing)       Start:  05/15/25    Expected End:  05/29/25               Pain - Adult            Education Documentation  Precautions, taught by Radha Garcia PT at 5/15/2025  4:25 PM.  Learner: Patient  Readiness: Acceptance  Method: Explanation  Response: Verbalizes Understanding, Needs Reinforcement    Mobility Training, taught by Radha Garcia PT at 5/15/2025  4:25 PM.  Learner: Patient  Readiness: Acceptance  Method: Explanation  Response: Verbalizes Understanding, Needs Reinforcement    Education Comments  No comments found.              [1]   Patient Active Problem List  Diagnosis    Acute congestive heart failure, unspecified heart failure type

## 2025-05-15 NOTE — PROGRESS NOTES
Cardiology Progress    Impression:  HFpEF.  Acute decompensated.  Uncontrolled hypertension  Hyperlipidemia  CKD 3  Plan:  Continue metoprolol and Norvasc for hypertension  If BP remains high, recommend adding losartan 25 daily.  Dose may be uptitrated as needed.  Continue gentle diuresis.  Follow labs.  HPI:  No problems overnight.  Breathing better.  Blood pressure has improved but remains quite high.  Meds:  Scheduled medications  Scheduled Medications[1]  Continuous medications  Continuous Medications[2]  PRN medications  PRN Medications[3]    Physical exam:  Vitals:    05/15/25 1000   BP: 174/73   Pulse:    Resp:    Temp:    SpO2:       No JVD.  Chest clear.  No edema.  EKG:  Telemetry shows sinus rhythm.  Echo:  Severe LVH.  Hyperdynamic LV function.  Moderate MR, TR.  RVSP 61.  Labs:  Lab Results   Component Value Date    WBC 5.9 05/15/2025    HGB 10.1 (L) 05/15/2025    HCT 32.2 (L) 05/15/2025     05/15/2025    CHOL 167 08/03/2020    TRIG 151 (H) 08/03/2020    HDL 46.9 08/03/2020    ALT 12 05/14/2025    AST 22 05/14/2025     05/15/2025    K 3.2 (L) 05/15/2025     05/15/2025    CREATININE 1.17 (H) 05/15/2025    BUN 12 05/15/2025    CO2 30 05/15/2025    HGBA1C 6.2 08/03/2020     par         [1] amLODIPine, 10 mg, oral, Daily  furosemide, 20 mg, intravenous, Daily  heparin, 5,000 Units, subcutaneous, q12h  metoprolol tartrate, 50 mg, oral, BID  perflutren lipid microspheres, 0.5-10 mL of dilution, intravenous, Once in imaging  perflutren protein A microsphere, 0.5 mL, intravenous, Once in imaging  polyethylene glycol, 17 g, oral, Daily  sulfur hexafluoride microsphr, 2 mL, intravenous, Once in imaging  [2]    [3] PRN medications: acetaminophen **OR** acetaminophen **OR** acetaminophen, hydrALAZINE, oxygen

## 2025-05-15 NOTE — CARE PLAN
The patient's goals for the shift include      The clinical goals for the shift include pt to remain free from falls until end of shift       Lanette

## 2025-05-16 ENCOUNTER — APPOINTMENT (OUTPATIENT)
Dept: RADIOLOGY | Facility: HOSPITAL | Age: OVER 89
DRG: 291 | End: 2025-05-16
Payer: MEDICARE

## 2025-05-16 LAB
ANION GAP SERPL CALC-SCNC: 13 MMOL/L (ref 10–20)
ATRIAL RATE: 85 BPM
BUN SERPL-MCNC: 15 MG/DL (ref 6–23)
CALCIUM SERPL-MCNC: 8.1 MG/DL (ref 8.6–10.3)
CHLORIDE SERPL-SCNC: 103 MMOL/L (ref 98–107)
CO2 SERPL-SCNC: 29 MMOL/L (ref 21–32)
CREAT SERPL-MCNC: 1.34 MG/DL (ref 0.5–1.05)
EGFRCR SERPLBLD CKD-EPI 2021: 35 ML/MIN/1.73M*2
ERYTHROCYTE [DISTWIDTH] IN BLOOD BY AUTOMATED COUNT: 15 % (ref 11.5–14.5)
GLUCOSE SERPL-MCNC: 114 MG/DL (ref 74–99)
HCT VFR BLD AUTO: 31.4 % (ref 36–46)
HGB BLD-MCNC: 9.9 G/DL (ref 12–16)
MCH RBC QN AUTO: 30.2 PG (ref 26–34)
MCHC RBC AUTO-ENTMCNC: 31.5 G/DL (ref 32–36)
MCV RBC AUTO: 96 FL (ref 80–100)
NRBC BLD-RTO: 0 /100 WBCS (ref 0–0)
P AXIS: 90 DEGREES
PLATELET # BLD AUTO: 296 X10*3/UL (ref 150–450)
POTASSIUM SERPL-SCNC: 3.8 MMOL/L (ref 3.5–5.3)
PR INTERVAL: 145 MS
Q ONSET: 249 MS
QRS COUNT: 14 BEATS
QRS DURATION: 91 MS
QT INTERVAL: 396 MS
QTC CALCULATION(BAZETT): 474 MS
QTC FREDERICIA: 446 MS
R AXIS: 81 DEGREES
RBC # BLD AUTO: 3.28 X10*6/UL (ref 4–5.2)
SODIUM SERPL-SCNC: 141 MMOL/L (ref 136–145)
T AXIS: 268 DEGREES
T OFFSET: 447 MS
VENTRICULAR RATE: 86 BPM
WBC # BLD AUTO: 6.3 X10*3/UL (ref 4.4–11.3)

## 2025-05-16 PROCEDURE — 1100000001 HC PRIVATE ROOM DAILY

## 2025-05-16 PROCEDURE — 85027 COMPLETE CBC AUTOMATED: CPT | Performed by: INTERNAL MEDICINE

## 2025-05-16 PROCEDURE — 36415 COLL VENOUS BLD VENIPUNCTURE: CPT | Performed by: INTERNAL MEDICINE

## 2025-05-16 PROCEDURE — 71045 X-RAY EXAM CHEST 1 VIEW: CPT

## 2025-05-16 PROCEDURE — 2500000004 HC RX 250 GENERAL PHARMACY W/ HCPCS (ALT 636 FOR OP/ED)

## 2025-05-16 PROCEDURE — 80048 BASIC METABOLIC PNL TOTAL CA: CPT | Performed by: INTERNAL MEDICINE

## 2025-05-16 PROCEDURE — 2500000001 HC RX 250 WO HCPCS SELF ADMINISTERED DRUGS (ALT 637 FOR MEDICARE OP)

## 2025-05-16 PROCEDURE — 2500000001 HC RX 250 WO HCPCS SELF ADMINISTERED DRUGS (ALT 637 FOR MEDICARE OP): Performed by: INTERNAL MEDICINE

## 2025-05-16 PROCEDURE — 71045 X-RAY EXAM CHEST 1 VIEW: CPT | Performed by: STUDENT IN AN ORGANIZED HEALTH CARE EDUCATION/TRAINING PROGRAM

## 2025-05-16 PROCEDURE — 2500000004 HC RX 250 GENERAL PHARMACY W/ HCPCS (ALT 636 FOR OP/ED): Mod: JZ | Performed by: NURSE PRACTITIONER

## 2025-05-16 RX ORDER — QUETIAPINE FUMARATE 25 MG/1
12.5 TABLET, FILM COATED ORAL ONCE
Status: DISCONTINUED | OUTPATIENT
Start: 2025-05-16 | End: 2025-05-18 | Stop reason: HOSPADM

## 2025-05-16 RX ORDER — HALOPERIDOL LACTATE 5 MG/ML
1 INJECTION, SOLUTION INTRAMUSCULAR ONCE
Status: COMPLETED | OUTPATIENT
Start: 2025-05-16 | End: 2025-05-16

## 2025-05-16 RX ORDER — LOSARTAN POTASSIUM 50 MG/1
50 TABLET ORAL DAILY
Status: DISCONTINUED | OUTPATIENT
Start: 2025-05-16 | End: 2025-05-16

## 2025-05-16 RX ORDER — LOSARTAN POTASSIUM 50 MG/1
100 TABLET ORAL DAILY
Status: DISCONTINUED | OUTPATIENT
Start: 2025-05-17 | End: 2025-05-18 | Stop reason: HOSPADM

## 2025-05-16 RX ORDER — HALOPERIDOL LACTATE 5 MG/ML
5 INJECTION, SOLUTION INTRAMUSCULAR ONCE
Status: CANCELLED | OUTPATIENT
Start: 2025-05-16 | End: 2025-05-16

## 2025-05-16 RX ADMIN — METOPROLOL TARTRATE 50 MG: 50 TABLET, FILM COATED ORAL at 08:54

## 2025-05-16 RX ADMIN — FUROSEMIDE 20 MG: 10 INJECTION, SOLUTION INTRAMUSCULAR; INTRAVENOUS at 08:54

## 2025-05-16 RX ADMIN — HYDRALAZINE HYDROCHLORIDE 5 MG: 20 INJECTION INTRAMUSCULAR; INTRAVENOUS at 03:38

## 2025-05-16 RX ADMIN — AMLODIPINE BESYLATE 10 MG: 10 TABLET ORAL at 08:54

## 2025-05-16 RX ADMIN — HEPARIN SODIUM 5000 UNITS: 5000 INJECTION, SOLUTION INTRAVENOUS; SUBCUTANEOUS at 01:07

## 2025-05-16 RX ADMIN — POLYETHYLENE GLYCOL 3350 17 G: 17 POWDER, FOR SOLUTION ORAL at 08:54

## 2025-05-16 RX ADMIN — HEPARIN SODIUM 5000 UNITS: 5000 INJECTION, SOLUTION INTRAVENOUS; SUBCUTANEOUS at 16:06

## 2025-05-16 RX ADMIN — HYDRALAZINE HYDROCHLORIDE 5 MG: 20 INJECTION INTRAMUSCULAR; INTRAVENOUS at 16:06

## 2025-05-16 RX ADMIN — LOSARTAN POTASSIUM 50 MG: 50 TABLET, FILM COATED ORAL at 08:54

## 2025-05-16 RX ADMIN — HALOPERIDOL LACTATE 1 MG: 5 INJECTION, SOLUTION INTRAMUSCULAR at 21:36

## 2025-05-16 ASSESSMENT — COGNITIVE AND FUNCTIONAL STATUS - GENERAL
TOILETING: A LITTLE
CLIMB 3 TO 5 STEPS WITH RAILING: A LOT
MOBILITY SCORE: 17
HELP NEEDED FOR BATHING: A LITTLE
DAILY ACTIVITIY SCORE: 21
MOVING TO AND FROM BED TO CHAIR: A LITTLE
MOVING FROM LYING ON BACK TO SITTING ON SIDE OF FLAT BED WITH BEDRAILS: A LITTLE
TURNING FROM BACK TO SIDE WHILE IN FLAT BAD: A LITTLE
STANDING UP FROM CHAIR USING ARMS: A LITTLE
DRESSING REGULAR LOWER BODY CLOTHING: A LITTLE
WALKING IN HOSPITAL ROOM: A LITTLE

## 2025-05-16 ASSESSMENT — PAIN - FUNCTIONAL ASSESSMENT: PAIN_FUNCTIONAL_ASSESSMENT: 0-10

## 2025-05-16 ASSESSMENT — PAIN SCALES - GENERAL
PAINLEVEL_OUTOF10: 0 - NO PAIN
PAINLEVEL_OUTOF10: 0 - NO PAIN

## 2025-05-16 NOTE — CARE PLAN
The patient's goals for the shift include      The clinical goals for the shift include pt to remain free from injury until end of shift

## 2025-05-16 NOTE — PROGRESS NOTES
Olivia East is a 100 y.o. female on day 1 of admission presenting with Acute congestive heart failure, unspecified heart failure type.      Subjective   No events overnight. Patient seen and examined at bedside with nursing present. She feels her breathing is improved.       Objective     Last Recorded Vitals  /79   Pulse 86   Temp 36.7 °C (98.1 °F)   Resp 18   Wt 45.4 kg (100 lb)   SpO2 96%   Intake/Output last 3 Shifts:    Intake/Output Summary (Last 24 hours) at 5/15/2025 2051  Last data filed at 5/15/2025 0908  Gross per 24 hour   Intake 388 ml   Output 650 ml   Net -262 ml       Admission Weight  Weight: 45.4 kg (100 lb) (05/14/25 0953)    Daily Weight  05/14/25 : 45.4 kg (100 lb)    Image Results  Transthoracic Echo Complete     [facility], [address] [place] [address]            Tel [phone] and Fax [phone]    TRANSTHORACIC ECHOCARDIOGRAM REPORT       Patient Name:       OLIVIA EAST          Reading Physician:    [id number] Michael Fung MD  Study Date:         5/14/2025           Ordering Provider:    [id number] ROGELIO BERNAL  MRN/PID:            [id number]            Fellow:  Accession#:         [id number]        Nurse:  Date of Birth/Age:  12/26/1924 / 100    Sonographer:          Teresa faustin RDCS  Gender assigned at  F                   Additional Staff:  Birth:  Height:                                 Admit Date:           5/14/2025  Weight:             45.36 kg            Admission Status:     Inpatient -                                                                Routine  BSA / BMI:          1.27 m2 / kg/m2     Encounter#:           [id number]  Blood Pressure:     258/105 mmHg        Department Location:  [facility]    Study Type:    TRANSTHORACIC ECHO (TTE) COMPLETE  Diagnosis/ICD:  Unspecified systolic (congestive) heart failure (CHF)-I50.20  Indication:    SOB  CPT Code:      Echo Complete w Full Doppler-[id number]    Patient History:  Pertinent History: HTN.    Study Detail: The following Echo studies were performed: 2D, M-Mode, Doppler and                color flow. Technically challenging study due to patient lying in                supine position.       PHYSICIAN INTERPRETATION:  Left Ventricle: Left ventricular ejection fraction is normal, by visual estimate at 65-70%. There is severely increased concentric left ventricular hypertrophy. There are no regional left ventricular wall motion abnormalities. The left ventricular cavity size is normal. Left ventricular diastolic filling is indeterminate.  Left Atrium: The left atrial size is moderately dilated.  Right Ventricle: The right ventricle is normal in size. There is normal right ventricular global systolic function.  Right Atrium: The right atrium is normal in size.  Aortic Valve: The aortic valve is trileaflet. There is mild aortic valve cusp calcification. There is trace aortic valve regurgitation. The peak instantaneous gradient of the aortic valve is 14 mmHg.  Mitral Valve: The mitral valve is normal in structure. There is mild mitral annular calcification. There is moderate mitral valve regurgitation.  Tricuspid Valve: The tricuspid valve is structurally normal. There is moderate tricuspid regurgitation. The Doppler estimated RVSP is moderate to severely elevated at 61.4 mmHg.  Pulmonic Valve: The pulmonic valve is not well visualized. The pulmonic valve regurgitation was not well visualized.  Pericardium: There is no pericardial effusion noted.  Aorta: The aortic root is normal.       CONCLUSIONS:   1. There is severely increased concentric left ventricular hypertrophy.   2. Left ventricular ejection fraction is normal, by visual estimate at 65-70%.   3. Moderate mitral valve regurgitation.   4. Moderate tricuspid regurgitation  visualized.   5. Moderate to severely elevated right ventricular systolic pressure.    QUANTITATIVE DATA SUMMARY:     2D MEASUREMENTS:          Normal Ranges:  Ao Root d:       2.80 cm  (2.0-3.7cm)  LAs:             4.50 cm  (2.7-4.0cm)  IVSd:            1.92 cm  (0.6-1.1cm)  LVPWd:           1.88 cm  (0.6-1.1cm)  LVIDd:           3.58 cm  (3.9-5.9cm)  LVIDs:           2.57 cm  LV Mass Index:   233 g/m2  LVEDV Index:     29 ml/m2  LV % FS          28.2 %       LEFT ATRIUM:                  Normal Ranges:  LA Vol A4C:        88.4 ml    (22+/-6mL/m2)  LA Vol A2C:        89.3 ml  LA Vol BP:         91.7 ml  LA Vol Index A4C:  69.5ml/m2  LA Vol Index A2C:  70.2 ml/m2  LA Vol Index BP:   72.1 ml/m2  LA Area A4C:       25.8 cm2  LA Area A2C:       25.1 cm2  LA Major Axis A4C: 6.4 cm  LA Major Axis A2C: 6.0 cm  LA Vol A4C:        86.1 ml  LA Vol A2C:        80.8 ml  LA Vol Index BSA:  65.6 ml/m2       RIGHT ATRIUM:          Normal Ranges:  RA Area A4C:  12.3 cm2       M-MODE MEASUREMENTS:         Normal Ranges:  Ao Root:             3.00 cm (2.0-3.7cm)  LAs:                 5.10 cm (2.7-4.0cm)       AORTA MEASUREMENTS:         Normal Ranges:  Asc Ao, d:          2.90 cm (2.1-3.4cm)       LV SYSTOLIC FUNCTION:                       Normal Ranges:  EF-A4C View:    74 % (>=55%)  EF-A2C View:    82 %  EF-Biplane:     81 %  EF-Visual:      68 %  LV EF Reported: 68 %       LV DIASTOLIC FUNCTION:           Normal Ranges:  MV Peak A:             0.61 m/s  (0.42-0.7 m/s)  MV e'                  0.061 m/s (>8.0)  MV lateral e'          0.06 m/s  MV medial e'           0.06 m/s       MITRAL VALVE:          Normal Ranges:  MV DT:        156 msec (150-240msec)       MITRAL INSUFFICIENCY:             Normal Ranges:  MR Vmax:              599.50 cm/s       AORTIC VALVE:           Normal Ranges:  AoV Vmax:     1.90 m/s  (<=1.7m/s)  AoV Peak P.4 mmHg (<20mmHg)  LVOT Max Delta: 0.92 m/s  (<=1.1m/s)  LVOT VTI:     15.50 cm       RIGHT  VENTRICLE:  RV Basal 3.06 cm  RV Mid   1.89 cm  RV Major 5.4 cm  TAPSE:   19.1 mm  RV s'    0.14 m/s       TRICUSPID VALVE/RVSP:          Normal Ranges:  Peak TR Velocity:     3.82 m/s  RV Syst Pressure:     61 mmHg  (< 30mmHg)       [id number] Michael Fung MD  Electronically signed on 5/14/2025 at 2:56:22 PM       ** Final **  XR chest 1 view  Narrative: Interpreted By:  Jan Michaels,   STUDY:  XR CHEST 1 VIEW; 5/14/2025 10:20 am      INDICATION:  CLINICAL INFORMATION: Signs/Symptoms:sob.      COMPARISON:  None      ACCESSION NUMBER(S):  [id number]      ORDERING CLINICIAN:  DEQUAN PRIDE      TECHNIQUE:  Portable chest one view.      FINDINGS:  The cardiac size is indeterminate in view of the AP projection.  Patchy bilateral perihilar and basilar infiltrates are present along  with suspicious small effusions.      Impression: Patchy bilateral perihilar and basilar infiltrates and probable small  effusions.      MACRO:  none      Signed by: Jan Michaels 5/14/2025 10:29 AM  Dictation workstation:   [id number]      Physical Exam  Constitutional:       Comments: Patient is asleep when I arrive, but easily arousable.  Alert and oriented x 3.   HENT:      Head: Normocephalic and atraumatic.      Nose: Nose normal.      Mouth/Throat:      Mouth: Mucous membranes are dry.      Pharynx: Oropharynx is clear.   Eyes:      Extraocular Movements: Extraocular movements intact.      Conjunctiva/sclera: Conjunctivae normal.      Pupils: Pupils are equal, round, and reactive to light.   Cardiovascular:      Rate and Rhythm: Normal rate and regular rhythm.      Pulses: Normal pulses.   Pulmonary:      Effort: Pulmonary effort is normal.      Breath sounds: Normal breath sounds. No wheezing or rales.   Abdominal:      General: Abdomen is flat. Bowel sounds are normal.      Palpations: Abdomen is soft.      Tenderness: There is no abdominal tenderness.   Musculoskeletal:         General: Normal range of motion.      Right lower leg:  Edema (1+) present.      Left lower leg: Edema (1+) present.   Skin:     General: Skin is warm and dry.   Neurological:      General: No focal deficit present.      Mental Status: She is alert and oriented to person, place, and time.   Psychiatric:         Mood and Affect: Mood normal.         Behavior: Behavior normal.         Thought Content: Thought content normal.      Relevant Results                              Assessment & Plan  Acute congestive heart failure, unspecified heart failure type    New onset congestive heart failure  Shortness of breath  Elevated BNP  Elevated high-sensitivity troponins  Hypertensive urgency  - Cardiology consult, recommendations appreciated  - Chest x-ray shows patchy bilateral perihilar and basilar infiltrates and probable small effusions  - Initial troponin 89, delta ordered and pending.  Trend  - EKG, per ED physician, normal sinus rhythm with ventricular rate 86 bpm.  No acute ST elevations.  T wave inversions in the inferior leads.  Slight ST sloping in V4 through V6  - Patient hypertensive to 253/133 in the ED, states that she has been taking her home metoprolol every day, though did miss her dose this a.m.   -Given IV labetalol in the ED  - Continue home metoprolol, IV hydralazine as needed  - Continue with IV Lasix 20mg daily, adjustments per attending/cardio, pt is diuretic naïve   - Echo added on  - I's&O's, daily weights, heart failure education     Elevated Cr  Anemia  - Unclear baseline, Cr 1.22 today  - Hgb 11.0 today  - Monitor with BMP/CBC     DVT Prophylaxis  - subQ heparin  - SCDs     5/15: Creatinine improved from 1.2 down to 1.1 with diuresis. Continue IV Lasix. Add losartan for blood pressure control.            Johny Winter,

## 2025-05-17 LAB
ANION GAP SERPL CALC-SCNC: 10 MMOL/L (ref 10–20)
BUN SERPL-MCNC: 21 MG/DL (ref 6–23)
CALCIUM SERPL-MCNC: 8.3 MG/DL (ref 8.6–10.3)
CHLORIDE SERPL-SCNC: 102 MMOL/L (ref 98–107)
CO2 SERPL-SCNC: 32 MMOL/L (ref 21–32)
CREAT SERPL-MCNC: 1.52 MG/DL (ref 0.5–1.05)
EGFRCR SERPLBLD CKD-EPI 2021: 30 ML/MIN/1.73M*2
ERYTHROCYTE [DISTWIDTH] IN BLOOD BY AUTOMATED COUNT: 14.8 % (ref 11.5–14.5)
GLUCOSE SERPL-MCNC: 105 MG/DL (ref 74–99)
HCT VFR BLD AUTO: 29.8 % (ref 36–46)
HGB BLD-MCNC: 9.3 G/DL (ref 12–16)
MCH RBC QN AUTO: 30.2 PG (ref 26–34)
MCHC RBC AUTO-ENTMCNC: 31.2 G/DL (ref 32–36)
MCV RBC AUTO: 97 FL (ref 80–100)
NRBC BLD-RTO: 0 /100 WBCS (ref 0–0)
PLATELET # BLD AUTO: 278 X10*3/UL (ref 150–450)
POTASSIUM SERPL-SCNC: 3.9 MMOL/L (ref 3.5–5.3)
RBC # BLD AUTO: 3.08 X10*6/UL (ref 4–5.2)
SODIUM SERPL-SCNC: 140 MMOL/L (ref 136–145)
WBC # BLD AUTO: 5.6 X10*3/UL (ref 4.4–11.3)

## 2025-05-17 PROCEDURE — 2500000004 HC RX 250 GENERAL PHARMACY W/ HCPCS (ALT 636 FOR OP/ED)

## 2025-05-17 PROCEDURE — 2500000001 HC RX 250 WO HCPCS SELF ADMINISTERED DRUGS (ALT 637 FOR MEDICARE OP): Performed by: INTERNAL MEDICINE

## 2025-05-17 PROCEDURE — 85027 COMPLETE CBC AUTOMATED: CPT | Performed by: INTERNAL MEDICINE

## 2025-05-17 PROCEDURE — 80048 BASIC METABOLIC PNL TOTAL CA: CPT | Performed by: INTERNAL MEDICINE

## 2025-05-17 PROCEDURE — 36415 COLL VENOUS BLD VENIPUNCTURE: CPT | Performed by: INTERNAL MEDICINE

## 2025-05-17 PROCEDURE — 1100000001 HC PRIVATE ROOM DAILY

## 2025-05-17 PROCEDURE — 2500000001 HC RX 250 WO HCPCS SELF ADMINISTERED DRUGS (ALT 637 FOR MEDICARE OP)

## 2025-05-17 RX ORDER — SODIUM CHLORIDE 9 MG/ML
75 INJECTION, SOLUTION INTRAVENOUS CONTINUOUS
Status: DISCONTINUED | OUTPATIENT
Start: 2025-05-17 | End: 2025-05-17

## 2025-05-17 RX ORDER — METOPROLOL TARTRATE 100 MG/1
100 TABLET ORAL 2 TIMES DAILY
Status: DISCONTINUED | OUTPATIENT
Start: 2025-05-17 | End: 2025-05-18 | Stop reason: HOSPADM

## 2025-05-17 RX ADMIN — LOSARTAN POTASSIUM 100 MG: 50 TABLET, FILM COATED ORAL at 09:34

## 2025-05-17 RX ADMIN — HEPARIN SODIUM 5000 UNITS: 5000 INJECTION, SOLUTION INTRAVENOUS; SUBCUTANEOUS at 02:58

## 2025-05-17 RX ADMIN — POLYETHYLENE GLYCOL 3350 17 G: 17 POWDER, FOR SOLUTION ORAL at 09:34

## 2025-05-17 RX ADMIN — METOPROLOL TARTRATE 50 MG: 50 TABLET, FILM COATED ORAL at 09:34

## 2025-05-17 RX ADMIN — AMLODIPINE BESYLATE 10 MG: 10 TABLET ORAL at 09:34

## 2025-05-17 RX ADMIN — METOPROLOL TARTRATE 100 MG: 100 TABLET, FILM COATED ORAL at 20:28

## 2025-05-17 RX ADMIN — HEPARIN SODIUM 5000 UNITS: 5000 INJECTION, SOLUTION INTRAVENOUS; SUBCUTANEOUS at 14:15

## 2025-05-17 RX ADMIN — FUROSEMIDE 20 MG: 10 INJECTION, SOLUTION INTRAMUSCULAR; INTRAVENOUS at 09:34

## 2025-05-17 ASSESSMENT — PAIN SCALES - GENERAL
PAINLEVEL_OUTOF10: 0 - NO PAIN
PAINLEVEL_OUTOF10: 0 - NO PAIN

## 2025-05-17 ASSESSMENT — PAIN - FUNCTIONAL ASSESSMENT: PAIN_FUNCTIONAL_ASSESSMENT: 0-10

## 2025-05-17 NOTE — CARE PLAN
The patient's goals for the shift include  safety and comfort    The clinical goals for the shift include pt to remain free from falls        Problem: Pain - Adult  Goal: Verbalizes/displays adequate comfort level or baseline comfort level  5/16/2025 2317 by Teresa Barragan RN  Outcome: Progressing  5/16/2025 2317 by Teresa Barragan RN  Outcome: Progressing     Problem: Safety - Adult  Goal: Free from fall injury  5/16/2025 2317 by Teresa Barragan RN  Outcome: Progressing  5/16/2025 2317 by Teresa Barragan RN  Outcome: Progressing     Problem: Discharge Planning  Goal: Discharge to home or other facility with appropriate resources  5/16/2025 2317 by Teresa Barragan RN  Outcome: Progressing  5/16/2025 2317 by Teresa Barragan RN  Outcome: Progressing     Problem: Chronic Conditions and Co-morbidities  Goal: Patient's chronic conditions and co-morbidity symptoms are monitored and maintained or improved  5/16/2025 2317 by Teresa Barragan RN  Outcome: Progressing  5/16/2025 2317 by Teresa Barragan RN  Outcome: Progressing     Problem: Nutrition  Goal: Nutrient intake appropriate for maintaining nutritional needs  5/16/2025 2317 by Teresa Barragan RN  Outcome: Progressing  5/16/2025 2317 by Teresa Barragan RN  Outcome: Progressing     Problem: Skin  Goal: Decreased wound size/increased tissue granulation at next dressing change  5/16/2025 2317 by Teresa Barragan RN  Outcome: Progressing  5/16/2025 2317 by Teresa Barragan RN  Outcome: Progressing  Goal: Participates in plan/prevention/treatment measures  5/16/2025 2317 by Teresa Barragan RN  Outcome: Progressing  5/16/2025 2317 by Teresa Barragan RN  Outcome: Progressing  Goal: Prevent/manage excess moisture  5/16/2025 2317 by Teresa Barragan RN  Outcome: Progressing  5/16/2025 2317 by Teresa Barragan RN  Outcome: Progressing  Goal: Prevent/minimize sheer/friction  injuries  5/16/2025 2317 by Teresa Barragan RN  Outcome: Progressing  5/16/2025 2317 by Teresa Barragan RN  Outcome: Progressing  Goal: Promote/optimize nutrition  5/16/2025 2317 by Teresa Barragan RN  Outcome: Progressing  5/16/2025 2317 by Teresa Barragan RN  Outcome: Progressing  Goal: Promote skin healing  5/16/2025 2317 by Teresa Barragan, TAMMY  Outcome: Progressing  5/16/2025 2317 by Teresa Barragan RN  Outcome: Progressing     Problem: Heart Failure  Goal: Improved gas exchange this shift  5/16/2025 2317 by Teresa Barragan, TAMMY  Outcome: Progressing  5/16/2025 2317 by Teresa Barragan RN  Outcome: Progressing  Goal: Improved urinary output this shift  5/16/2025 2317 by Teresa Barragan RN  Outcome: Progressing  5/16/2025 2317 by Teresa Barragan RN  Outcome: Progressing  Goal: Reduction in peripheral edema within 24 hours  5/16/2025 2317 by Teresa Barragan, TAMMY  Outcome: Progressing  5/16/2025 2317 by Teresa Barragan RN  Outcome: Progressing  Goal: Report improvement of dyspnea/breathlessness this shift  5/16/2025 2317 by Teresa Barragan, TAMMY  Outcome: Progressing  5/16/2025 2317 by Teresa Barragan RN  Outcome: Progressing  Goal: Weight from fluid excess reduced over 2-3 days, then stabilize  5/16/2025 2317 by Teresa Barragan, TAMMY  Outcome: Progressing  5/16/2025 2317 by Teresa Barragan RN  Outcome: Progressing  Goal: Increase self care and/or family involvement in 24 hours  5/16/2025 2317 by Teresa Barragan RN  Outcome: Progressing  5/16/2025 2317 by Teresa Barragan RN  Outcome: Progressing

## 2025-05-17 NOTE — PROGRESS NOTES
Nae Samayoa is a 100 y.o. female on day 2 of admission presenting with Acute congestive heart failure, unspecified heart failure type.      Subjective   No events overnight. Patient seen and examined at bedside with nursing present. She has no new complaints.       Objective     Last Recorded Vitals  /73   Pulse 87   Temp 36.4 °C (97.5 °F)   Resp 18   Wt 45.4 kg (100 lb)   SpO2 98%   Intake/Output last 3 Shifts:    Intake/Output Summary (Last 24 hours) at 5/16/2025 2149  Last data filed at 5/16/2025 0825  Gross per 24 hour   Intake 338 ml   Output --   Net 338 ml       Admission Weight  Weight: 45.4 kg (100 lb) (05/14/25 0953)    Daily Weight  05/14/25 : 45.4 kg (100 lb)    Image Results  ECG 12 lead  Sinus rhythm  Atrial premature complex  Borderline right axis deviation  Probable LVH with secondary repol abnrm  Anterior Q waves, possibly due to LVH  XR chest 1 view  Narrative: Interpreted By:  Vincent Vega,   STUDY:  XR CHEST 1 VIEW; 5/16/2025 7:36 am      INDICATION:  Signs/Symptoms:CHF.      COMPARISON:  Chest x-ray 05/14/2025      ACCESSION NUMBER(S):  [id number]      ORDERING CLINICIAN:  CHRIS HUTSON      TECHNIQUE:  1 view of the chest was performed.      FINDINGS:  Small left-sided pleural effusion with surrounding atelectasis. Trace  right-sided pleural effusion. Stable perihilar vascular congestion.  No pneumothorax.  The cardiomediastinal silhouette is stable.      Impression: 1. Slightly improved layering left-greater-than-right pleural  effusion with surrounding atelectasis.      Signed by: Vincent Vega 5/16/2025 7:53 AM  Dictation workstation:   [id number]      Physical Exam  Constitutional:       Comments: Patient is asleep when I arrive, but easily arousable.  Alert and oriented x 3.   HENT:      Head: Normocephalic and atraumatic.      Nose: Nose normal.      Mouth/Throat:      Mouth: Mucous membranes are dry.      Pharynx: Oropharynx is clear.   Eyes:      Extraocular  Movements: Extraocular movements intact.      Conjunctiva/sclera: Conjunctivae normal.      Pupils: Pupils are equal, round, and reactive to light.   Cardiovascular:      Rate and Rhythm: Normal rate and regular rhythm.      Pulses: Normal pulses.   Pulmonary:      Effort: Pulmonary effort is normal.      Breath sounds: Normal breath sounds. No wheezing or rales.   Abdominal:      General: Abdomen is flat. Bowel sounds are normal.      Palpations: Abdomen is soft.      Tenderness: There is no abdominal tenderness.   Musculoskeletal:         General: Normal range of motion.      Right lower leg: Edema (1+) present.      Left lower leg: Edema (1+) present.   Skin:     General: Skin is warm and dry.   Neurological:      General: No focal deficit present.      Mental Status: She is alert and oriented to person, place, and time.   Psychiatric:         Mood and Affect: Mood normal.         Behavior: Behavior normal.         Thought Content: Thought content normal.      Relevant Results                              Assessment & Plan  Acute congestive heart failure, unspecified heart failure type    New onset congestive heart failure  Shortness of breath  Elevated BNP  Elevated high-sensitivity troponins  Hypertensive urgency  - Cardiology consult, recommendations appreciated  - Chest x-ray shows patchy bilateral perihilar and basilar infiltrates and probable small effusions  - Initial troponin 89, delta ordered and pending.  Trend  - EKG, per ED physician, normal sinus rhythm with ventricular rate 86 bpm.  No acute ST elevations.  T wave inversions in the inferior leads.  Slight ST sloping in V4 through V6  - Patient hypertensive to 253/133 in the ED, states that she has been taking her home metoprolol every day, though did miss her dose this a.m.   -Given IV labetalol in the ED  - Continue home metoprolol, IV hydralazine as needed  - Continue with IV Lasix 20mg daily, adjustments per attending/cardio, pt is diuretic naïve    - Echo added on  - I's&O's, daily weights, heart failure education     Elevated Cr  Anemia  - Unclear baseline, Cr 1.22 today  - Hgb 11.0 today  - Monitor with BMP/CBC     DVT Prophylaxis  - subQ heparin  - SCDs     5/15: Creatinine improved from 1.2 down to 1.1 with diuresis. Continue IV Lasix. Add losartan for blood pressure control.     5/16: Patient remains hypertensive. Increase losartan to 50mg. Continue amlodipine and metoprolol. Continue IV Lasix. Discharge home with [facility] once BP acceptable.           Johny Winter, DO

## 2025-05-17 NOTE — CARE PLAN
The clinical goals for the shift include maintain safety and comfort      Problem: Pain - Adult  Goal: Verbalizes/displays adequate comfort level or baseline comfort level  5/17/2025 1705 by Benjamin Muro RN  Outcome: Progressing  5/17/2025 1701 by Benjamin Muro RN  Outcome: Progressing     Problem: Safety - Adult  Goal: Free from fall injury  5/17/2025 1705 by Benjamin Muro RN  Outcome: Progressing  5/17/2025 1701 by Benjamin Muro RN  Outcome: Progressing     Problem: Discharge Planning  Goal: Discharge to home or other facility with appropriate resources  5/17/2025 1705 by Benjamin Muro RN  Outcome: Progressing  5/17/2025 1701 by Benjamin Muro RN  Outcome: Progressing     Problem: Chronic Conditions and Co-morbidities  Goal: Patient's chronic conditions and co-morbidity symptoms are monitored and maintained or improved  5/17/2025 1705 by Benjamin Muro RN  Outcome: Progressing  5/17/2025 1701 by Benjamin Muro RN  Outcome: Progressing     Problem: Nutrition  Goal: Nutrient intake appropriate for maintaining nutritional needs  5/17/2025 1705 by Benjamin Muro RN  Outcome: Progressing  5/17/2025 1701 by Benjamin Muro RN  Outcome: Progressing     Problem: Skin  Goal: Decreased wound size/increased tissue granulation at next dressing change  5/17/2025 1705 by Benjamin Muro RN  Outcome: Progressing  5/17/2025 1701 by Benjamin Muro RN  Outcome: Progressing  Goal: Participates in plan/prevention/treatment measures  5/17/2025 1705 by Benjamin Muro RN  Outcome: Progressing  5/17/2025 1701 by Benjamin Muro RN  Outcome: Progressing  Goal: Prevent/manage excess moisture  5/17/2025 1705 by Benjamin Muro RN  Outcome: Progressing  5/17/2025 1701 by Benjamin Muro RN  Outcome: Progressing  Goal: Prevent/minimize sheer/friction injuries  5/17/2025 1705 by Benjamin Muro RN  Outcome: Progressing  5/17/2025 1701 by Benjamin Muro RN  Outcome: Progressing  Goal: Promote/optimize nutrition  5/17/2025 1705 by Benjamin Muro RN  Outcome: Progressing  5/17/2025  1701 by Benjamin Muro RN  Outcome: Progressing  Goal: Promote skin healing  5/17/2025 1705 by Benjamin Muro RN  Outcome: Progressing  5/17/2025 1701 by Benjamin Muro RN  Outcome: Progressing     Problem: Heart Failure  Goal: Improved gas exchange this shift  5/17/2025 1705 by Benjamin Muro RN  Outcome: Progressing  5/17/2025 1701 by Benjamin Muro RN  Outcome: Progressing  Goal: Improved urinary output this shift  5/17/2025 1705 by Benjamin Muro RN  Outcome: Progressing  5/17/2025 1701 by Benjamin Muro RN  Outcome: Progressing  Goal: Reduction in peripheral edema within 24 hours  5/17/2025 1705 by Benjamin Muro RN  Outcome: Progressing  5/17/2025 1701 by Benjamin Muro RN  Outcome: Progressing  Goal: Report improvement of dyspnea/breathlessness this shift  5/17/2025 1705 by Benjamin Muro RN  Outcome: Progressing  5/17/2025 1701 by Benjamin Muro RN  Outcome: Progressing  Goal: Weight from fluid excess reduced over 2-3 days, then stabilize  5/17/2025 1705 by Benjamin Muro RN  Outcome: Progressing  5/17/2025 1701 by Benjamin Muro RN  Outcome: Progressing  Goal: Increase self care and/or family involvement in 24 hours  5/17/2025 1705 by Benjamin Muro RN  Outcome: Progressing  5/17/2025 1701 by Benjamin Muro RN  Outcome: Progressing     Problem: Fall/Injury  Goal: Not fall by end of shift  5/17/2025 1705 by Benjamin Muro RN  Outcome: Progressing  5/17/2025 1701 by Benjamin Muro RN  Outcome: Progressing  Goal: Be free from injury by end of the shift  5/17/2025 1705 by Benjamin Muro RN  Outcome: Progressing  5/17/2025 1701 by Benjamin Muro RN  Outcome: Progressing  Goal: Verbalize understanding of personal risk factors for fall in the hospital  5/17/2025 1705 by Benjamin Muro RN  Outcome: Progressing  5/17/2025 1701 by Benjamin Muro RN  Outcome: Progressing  Goal: Verbalize understanding of risk factor reduction measures to prevent injury from fall in the home  5/17/2025 1705 by Benjamin Muro RN  Outcome: Progressing  5/17/2025 1701 by Benjamin  TAMMY Muro  Outcome: Progressing  Goal: Use assistive devices by end of the shift  5/17/2025 1705 by Benjamin Muro RN  Outcome: Progressing  5/17/2025 1701 by Benjamin Muro RN  Outcome: Progressing  Goal: Pace activities to prevent fatigue by end of the shift  5/17/2025 1705 by Benjamin Muro RN  Outcome: Progressing  5/17/2025 1701 by Benjamin Muro RN  Outcome: Progressing

## 2025-05-18 ENCOUNTER — DOCUMENTATION (OUTPATIENT)
Dept: HOME HEALTH SERVICES | Facility: HOME HEALTH | Age: OVER 89
End: 2025-05-18
Payer: MEDICARE

## 2025-05-18 ENCOUNTER — HOME HEALTH ADMISSION (OUTPATIENT)
Dept: HOME HEALTH SERVICES | Facility: HOME HEALTH | Age: OVER 89
End: 2025-05-18
Payer: MEDICARE

## 2025-05-18 VITALS
DIASTOLIC BLOOD PRESSURE: 70 MMHG | HEART RATE: 72 BPM | OXYGEN SATURATION: 92 % | BODY MASS INDEX: 18.88 KG/M2 | HEIGHT: 61 IN | SYSTOLIC BLOOD PRESSURE: 163 MMHG | WEIGHT: 100 LBS | TEMPERATURE: 98.1 F | RESPIRATION RATE: 18 BRPM

## 2025-05-18 LAB
ANION GAP SERPL CALC-SCNC: 13 MMOL/L (ref 10–20)
BUN SERPL-MCNC: 25 MG/DL (ref 6–23)
CALCIUM SERPL-MCNC: 8 MG/DL (ref 8.6–10.3)
CHLORIDE SERPL-SCNC: 105 MMOL/L (ref 98–107)
CO2 SERPL-SCNC: 30 MMOL/L (ref 21–32)
CREAT SERPL-MCNC: 1.48 MG/DL (ref 0.5–1.05)
EGFRCR SERPLBLD CKD-EPI 2021: 31 ML/MIN/1.73M*2
ERYTHROCYTE [DISTWIDTH] IN BLOOD BY AUTOMATED COUNT: 14.8 % (ref 11.5–14.5)
GLUCOSE SERPL-MCNC: 96 MG/DL (ref 74–99)
HCT VFR BLD AUTO: 32.8 % (ref 36–46)
HGB BLD-MCNC: 9.9 G/DL (ref 12–16)
MCH RBC QN AUTO: 29.8 PG (ref 26–34)
MCHC RBC AUTO-ENTMCNC: 30.2 G/DL (ref 32–36)
MCV RBC AUTO: 99 FL (ref 80–100)
NRBC BLD-RTO: 0 /100 WBCS (ref 0–0)
PLATELET # BLD AUTO: 291 X10*3/UL (ref 150–450)
POTASSIUM SERPL-SCNC: 3.9 MMOL/L (ref 3.5–5.3)
RBC # BLD AUTO: 3.32 X10*6/UL (ref 4–5.2)
SODIUM SERPL-SCNC: 144 MMOL/L (ref 136–145)
WBC # BLD AUTO: 4.9 X10*3/UL (ref 4.4–11.3)

## 2025-05-18 PROCEDURE — 2500000001 HC RX 250 WO HCPCS SELF ADMINISTERED DRUGS (ALT 637 FOR MEDICARE OP)

## 2025-05-18 PROCEDURE — 85027 COMPLETE CBC AUTOMATED: CPT | Performed by: INTERNAL MEDICINE

## 2025-05-18 PROCEDURE — 36415 COLL VENOUS BLD VENIPUNCTURE: CPT | Performed by: INTERNAL MEDICINE

## 2025-05-18 PROCEDURE — 2500000004 HC RX 250 GENERAL PHARMACY W/ HCPCS (ALT 636 FOR OP/ED): Mod: JZ

## 2025-05-18 PROCEDURE — 2500000001 HC RX 250 WO HCPCS SELF ADMINISTERED DRUGS (ALT 637 FOR MEDICARE OP): Performed by: INTERNAL MEDICINE

## 2025-05-18 PROCEDURE — 82374 ASSAY BLOOD CARBON DIOXIDE: CPT | Performed by: INTERNAL MEDICINE

## 2025-05-18 RX ORDER — METOPROLOL TARTRATE 100 MG/1
100 TABLET ORAL 2 TIMES DAILY
Qty: 180 TABLET | Refills: 0 | Status: SHIPPED | OUTPATIENT
Start: 2025-05-18

## 2025-05-18 RX ORDER — AMLODIPINE BESYLATE 10 MG/1
10 TABLET ORAL DAILY
Qty: 90 TABLET | Refills: 0 | Status: SHIPPED | OUTPATIENT
Start: 2025-05-18

## 2025-05-18 RX ORDER — FUROSEMIDE 20 MG/1
10 TABLET ORAL DAILY
Qty: 45 TABLET | Refills: 0 | Status: SHIPPED | OUTPATIENT
Start: 2025-05-19

## 2025-05-18 RX ORDER — LOSARTAN POTASSIUM 100 MG/1
100 TABLET ORAL DAILY
Qty: 90 TABLET | Refills: 0 | Status: SHIPPED | OUTPATIENT
Start: 2025-05-19

## 2025-05-18 RX ADMIN — HYDRALAZINE HYDROCHLORIDE 5 MG: 20 INJECTION INTRAMUSCULAR; INTRAVENOUS at 04:31

## 2025-05-18 RX ADMIN — METOPROLOL TARTRATE 100 MG: 100 TABLET, FILM COATED ORAL at 09:52

## 2025-05-18 RX ADMIN — LOSARTAN POTASSIUM 100 MG: 50 TABLET, FILM COATED ORAL at 09:52

## 2025-05-18 RX ADMIN — HEPARIN SODIUM 5000 UNITS: 5000 INJECTION, SOLUTION INTRAVENOUS; SUBCUTANEOUS at 02:44

## 2025-05-18 RX ADMIN — AMLODIPINE BESYLATE 10 MG: 10 TABLET ORAL at 09:52

## 2025-05-18 RX ADMIN — POLYETHYLENE GLYCOL 3350 17 G: 17 POWDER, FOR SOLUTION ORAL at 09:52

## 2025-05-18 ASSESSMENT — PAIN SCALES - GENERAL: PAINLEVEL_OUTOF10: 0 - NO PAIN

## 2025-05-18 NOTE — CARE PLAN
The clinical goals for the shift include maintain safety and comfort      Problem: Pain - Adult  Goal: Verbalizes/displays adequate comfort level or baseline comfort level  Outcome: Progressing     Problem: Safety - Adult  Goal: Free from fall injury  Outcome: Progressing     Problem: Discharge Planning  Goal: Discharge to home or other facility with appropriate resources  Outcome: Progressing     Problem: Chronic Conditions and Co-morbidities  Goal: Patient's chronic conditions and co-morbidity symptoms are monitored and maintained or improved  Outcome: Progressing     Problem: Nutrition  Goal: Nutrient intake appropriate for maintaining nutritional needs  Outcome: Progressing     Problem: Skin  Goal: Decreased wound size/increased tissue granulation at next dressing change  Outcome: Progressing  Goal: Participates in plan/prevention/treatment measures  Outcome: Progressing  Goal: Prevent/manage excess moisture  Outcome: Progressing  Goal: Prevent/minimize sheer/friction injuries  Outcome: Progressing  Goal: Promote/optimize nutrition  Outcome: Progressing  Goal: Promote skin healing  Outcome: Progressing     Problem: Heart Failure  Goal: Improved gas exchange this shift  Outcome: Progressing  Goal: Improved urinary output this shift  Outcome: Progressing  Goal: Reduction in peripheral edema within 24 hours  Outcome: Progressing  Goal: Report improvement of dyspnea/breathlessness this shift  Outcome: Progressing  Goal: Weight from fluid excess reduced over 2-3 days, then stabilize  Outcome: Progressing  Goal: Increase self care and/or family involvement in 24 hours  Outcome: Progressing     Problem: Fall/Injury  Goal: Not fall by end of shift  Outcome: Progressing  Goal: Be free from injury by end of the shift  Outcome: Progressing  Goal: Verbalize understanding of personal risk factors for fall in the hospital  Outcome: Progressing  Goal: Verbalize understanding of risk factor reduction measures to prevent injury  from fall in the home  Outcome: Progressing  Goal: Use assistive devices by end of the shift  Outcome: Progressing  Goal: Pace activities to prevent fatigue by end of the shift  Outcome: Progressing

## 2025-05-18 NOTE — NURSING NOTE
Pt discharging at this time. No pain or discomfort noted. AVS reviewed with son. Medication and patient education provide. IV removed. Patient to be taken to main entrance by wheelchair transport to meet son.

## 2025-05-18 NOTE — PROGRESS NOTES
Nae Samayoa is a 100 y.o. female on day 4 of admission presenting with Acute congestive heart failure, unspecified heart failure type.      Subjective   No events overnight. Patient seen and examined at bedside. She has no new complaints.       Objective     Last Recorded Vitals  /70 (BP Location: Right arm, Patient Position: Lying)   Pulse 72   Temp 36.7 °C (98.1 °F) (Temporal)   Resp 18   Wt 45.4 kg (100 lb)   SpO2 92%   Intake/Output last 3 Shifts:  No intake or output data in the 24 hours ending 05/18/25 1229      Admission Weight  Weight: 45.4 kg (100 lb) (05/14/25 0953)    Daily Weight  05/14/25 : 45.4 kg (100 lb)    Image Results  ECG 12 lead  Sinus rhythm  Atrial premature complex  Borderline right axis deviation  Probable LVH with secondary repol abnrm  Anterior Q waves, possibly due to LVH  XR chest 1 view  Narrative: Interpreted By:  Vincent Vega,   STUDY:  XR CHEST 1 VIEW; 5/16/2025 7:36 am      INDICATION:  Signs/Symptoms:CHF.      COMPARISON:  Chest x-ray 05/14/2025      ACCESSION NUMBER(S):  [id number]      ORDERING CLINICIAN:  CHRIS HUTSON      TECHNIQUE:  1 view of the chest was performed.      FINDINGS:  Small left-sided pleural effusion with surrounding atelectasis. Trace  right-sided pleural effusion. Stable perihilar vascular congestion.  No pneumothorax.  The cardiomediastinal silhouette is stable.      Impression: 1. Slightly improved layering left-greater-than-right pleural  effusion with surrounding atelectasis.      Signed by: Vincent Vega 5/16/2025 7:53 AM  Dictation workstation:   [id number]      Physical Exam  Constitutional:       Comments: Patient is asleep when I arrive, but easily arousable.  Alert and oriented x 3.   HENT:      Head: Normocephalic and atraumatic.      Nose: Nose normal.      Mouth/Throat:      Mouth: Mucous membranes are dry.      Pharynx: Oropharynx is clear.   Eyes:      Extraocular Movements: Extraocular movements intact.       Conjunctiva/sclera: Conjunctivae normal.      Pupils: Pupils are equal, round, and reactive to light.   Cardiovascular:      Rate and Rhythm: Normal rate and regular rhythm.      Pulses: Normal pulses.   Pulmonary:      Effort: Pulmonary effort is normal.      Breath sounds: Normal breath sounds. No wheezing or rales.   Abdominal:      General: Abdomen is flat. Bowel sounds are normal.      Palpations: Abdomen is soft.      Tenderness: There is no abdominal tenderness.   Musculoskeletal:         General: Normal range of motion.      Right lower leg: Edema (1+) present.      Left lower leg: Edema (1+) present.   Skin:     General: Skin is warm and dry.   Neurological:      General: No focal deficit present.      Mental Status: She is alert and oriented to person, place, and time.   Psychiatric:         Mood and Affect: Mood normal.         Behavior: Behavior normal.         Thought Content: Thought content normal.      Relevant Results                              Assessment & Plan  Acute congestive heart failure, unspecified heart failure type    New onset congestive heart failure  Shortness of breath  Elevated BNP  Elevated high-sensitivity troponins  Hypertensive urgency  - Cardiology consult, recommendations appreciated  - Chest x-ray shows patchy bilateral perihilar and basilar infiltrates and probable small effusions  - Initial troponin 89, delta ordered and pending.  Trend  - EKG, per ED physician, normal sinus rhythm with ventricular rate 86 bpm.  No acute ST elevations.  T wave inversions in the inferior leads.  Slight ST sloping in V4 through V6  - Patient hypertensive to 253/133 in the ED, states that she has been taking her home metoprolol every day, though did miss her dose this a.m.   -Given IV labetalol in the ED  - Continue home metoprolol, IV hydralazine as needed  - Continue with IV Lasix 20mg daily, adjustments per attending/cardio, pt is diuretic naïve   - Echo added on  - I's&O's, daily weights,  heart failure education     Elevated Cr  Anemia  - Unclear baseline, Cr 1.22 today  - Hgb 11.0 today  - Monitor with BMP/CBC     DVT Prophylaxis  - subQ heparin  - SCDs     5/15: Creatinine improved from 1.2 down to 1.1 with diuresis. Continue IV Lasix. Add losartan for blood pressure control.     5/16: Patient remains hypertensive. Increase losartan to 50mg. Continue amlodipine and metoprolol. Continue IV Lasix. Discharge home with [facility] once BP acceptable.    5/17: Creatinine increased from 1.3 up to 1.5.  Will hold IV Lasix.  Repeat chest x-ray shows improvement.  Patient is on room air.  Will check a renal artery ultrasound given refractory hypertension.  Increase metoprolol.         Johny Winter, DO       Tarsorrhaphy Text: A tarsorrhaphy was performed using Frost sutures.

## 2025-05-18 NOTE — CARE PLAN
The patient's goals for the shift include  safety and comfort    The clinical goals for the shift include maintain safety and comfort        Problem: Pain - Adult  Goal: Verbalizes/displays adequate comfort level or baseline comfort level  Outcome: Progressing     Problem: Safety - Adult  Goal: Free from fall injury  Outcome: Progressing     Problem: Discharge Planning  Goal: Discharge to home or other facility with appropriate resources  Outcome: Progressing     Problem: Chronic Conditions and Co-morbidities  Goal: Patient's chronic conditions and co-morbidity symptoms are monitored and maintained or improved  Outcome: Progressing     Problem: Nutrition  Goal: Nutrient intake appropriate for maintaining nutritional needs  Outcome: Progressing     Problem: Skin  Goal: Decreased wound size/increased tissue granulation at next dressing change  Outcome: Progressing  Goal: Participates in plan/prevention/treatment measures  Outcome: Progressing  Goal: Prevent/manage excess moisture  Outcome: Progressing  Goal: Prevent/minimize sheer/friction injuries  Outcome: Progressing  Goal: Promote/optimize nutrition  Outcome: Progressing  Goal: Promote skin healing  Outcome: Progressing     Problem: Heart Failure  Goal: Improved gas exchange this shift  Outcome: Progressing  Goal: Improved urinary output this shift  Outcome: Progressing  Goal: Reduction in peripheral edema within 24 hours  Outcome: Progressing  Goal: Report improvement of dyspnea/breathlessness this shift  Outcome: Progressing  Goal: Weight from fluid excess reduced over 2-3 days, then stabilize  Outcome: Progressing  Goal: Increase self care and/or family involvement in 24 hours  Outcome: Progressing     Problem: Fall/Injury  Goal: Not fall by end of shift  Outcome: Progressing  Goal: Be free from injury by end of the shift  Outcome: Progressing  Goal: Verbalize understanding of personal risk factors for fall in the hospital  Outcome: Progressing  Goal: Verbalize  understanding of risk factor reduction measures to prevent injury from fall in the home  Outcome: Progressing  Goal: Use assistive devices by end of the shift  Outcome: Progressing  Goal: Pace activities to prevent fatigue by end of the shift  Outcome: Progressing

## 2025-05-18 NOTE — HH CARE COORDINATION
Home Care received a Referral for Nursing, Physical Therapy, Occupational Therapy, and Home Health Aide. We have processed the referral for a Start of Care on 24-48hrs.     If you have any questions or concerns, please feel free to contact us at [phone]. Follow the prompts, enter your five digit zip code, and you will be directed to your care team on [facility].

## 2025-05-19 ENCOUNTER — HOSPITAL ENCOUNTER (INPATIENT)
Facility: HOSPITAL | Age: OVER 89
LOS: 2 days | Discharge: HOME HEALTH | DRG: 177 | End: 2025-05-21
Attending: EMERGENCY MEDICINE | Admitting: INTERNAL MEDICINE
Payer: MEDICARE

## 2025-05-19 ENCOUNTER — APPOINTMENT (OUTPATIENT)
Dept: RADIOLOGY | Facility: HOSPITAL | Age: OVER 89
DRG: 177 | End: 2025-05-19
Payer: MEDICARE

## 2025-05-19 DIAGNOSIS — T17.908S ASPIRATION INTO AIRWAY, SEQUELA: Primary | ICD-10-CM

## 2025-05-19 PROBLEM — J69.0 ASPIRATION PNEUMONIA (MULTI): Status: ACTIVE | Noted: 2025-05-19

## 2025-05-19 PROBLEM — R73.9 HYPERGLYCEMIA: Status: ACTIVE | Noted: 2025-05-19

## 2025-05-19 PROBLEM — D64.9 ANEMIA: Status: ACTIVE | Noted: 2025-05-19

## 2025-05-19 PROBLEM — R09.02 HYPOXIA: Status: ACTIVE | Noted: 2025-05-19

## 2025-05-19 PROBLEM — I50.9 CHF (CONGESTIVE HEART FAILURE): Status: ACTIVE | Noted: 2025-05-19

## 2025-05-19 PROBLEM — I10 HTN (HYPERTENSION): Status: ACTIVE | Noted: 2025-05-19

## 2025-05-19 PROBLEM — G93.40 ENCEPHALOPATHY: Status: ACTIVE | Noted: 2025-05-19

## 2025-05-19 LAB
ALBUMIN SERPL BCP-MCNC: 3.2 G/DL (ref 3.4–5)
ALP SERPL-CCNC: 98 U/L (ref 33–136)
ALT SERPL W P-5'-P-CCNC: 34 U/L (ref 7–45)
ANION GAP SERPL CALC-SCNC: 14 MMOL/L (ref 10–20)
APPEARANCE UR: CLEAR
AST SERPL W P-5'-P-CCNC: 84 U/L (ref 9–39)
BACTERIA #/AREA URNS AUTO: ABNORMAL /HPF
BASOPHILS # BLD AUTO: 0.06 X10*3/UL (ref 0–0.1)
BASOPHILS NFR BLD AUTO: 0.9 %
BILIRUB SERPL-MCNC: 0.7 MG/DL (ref 0–1.2)
BILIRUB UR STRIP.AUTO-MCNC: NEGATIVE MG/DL
BUN SERPL-MCNC: 28 MG/DL (ref 6–23)
CALCIUM SERPL-MCNC: 8.1 MG/DL (ref 8.6–10.3)
CHLORIDE SERPL-SCNC: 106 MMOL/L (ref 98–107)
CO2 SERPL-SCNC: 25 MMOL/L (ref 21–32)
COLOR UR: COLORLESS
CREAT SERPL-MCNC: 1.4 MG/DL (ref 0.5–1.05)
EGFRCR SERPLBLD CKD-EPI 2021: 34 ML/MIN/1.73M*2
EOSINOPHIL # BLD AUTO: 0.07 X10*3/UL (ref 0–0.4)
EOSINOPHIL NFR BLD AUTO: 1.1 %
ERYTHROCYTE [DISTWIDTH] IN BLOOD BY AUTOMATED COUNT: 14.6 % (ref 11.5–14.5)
ETHANOL SERPL-MCNC: <10 MG/DL
GLUCOSE BLD MANUAL STRIP-MCNC: 257 MG/DL (ref 74–99)
GLUCOSE BLD MANUAL STRIP-MCNC: 295 MG/DL (ref 74–99)
GLUCOSE SERPL-MCNC: 257 MG/DL (ref 74–99)
GLUCOSE UR STRIP.AUTO-MCNC: ABNORMAL MG/DL
HCT VFR BLD AUTO: 34.8 % (ref 36–46)
HGB BLD-MCNC: 10.7 G/DL (ref 12–16)
IMM GRANULOCYTES # BLD AUTO: 0.02 X10*3/UL (ref 0–0.5)
IMM GRANULOCYTES NFR BLD AUTO: 0.3 % (ref 0–0.9)
INR PPP: 1 (ref 0.9–1.1)
KETONES UR STRIP.AUTO-MCNC: NEGATIVE MG/DL
LACTATE SERPL-SCNC: 2 MMOL/L (ref 0.4–2)
LEUKOCYTE ESTERASE UR QL STRIP.AUTO: NEGATIVE
LYMPHOCYTES # BLD AUTO: 2.85 X10*3/UL (ref 0.8–3)
LYMPHOCYTES NFR BLD AUTO: 44.6 %
MCH RBC QN AUTO: 30.1 PG (ref 26–34)
MCHC RBC AUTO-ENTMCNC: 30.7 G/DL (ref 32–36)
MCV RBC AUTO: 98 FL (ref 80–100)
MONOCYTES # BLD AUTO: 0.52 X10*3/UL (ref 0.05–0.8)
MONOCYTES NFR BLD AUTO: 8.1 %
MUCOUS THREADS #/AREA URNS AUTO: ABNORMAL /LPF
NEUTROPHILS # BLD AUTO: 2.87 X10*3/UL (ref 1.6–5.5)
NEUTROPHILS NFR BLD AUTO: 45 %
NITRITE UR QL STRIP.AUTO: NEGATIVE
NRBC BLD-RTO: 0 /100 WBCS (ref 0–0)
PH UR STRIP.AUTO: 7 [PH]
PLATELET # BLD AUTO: 316 X10*3/UL (ref 150–450)
POTASSIUM SERPL-SCNC: 3.9 MMOL/L (ref 3.5–5.3)
PROT SERPL-MCNC: 6.5 G/DL (ref 6.4–8.2)
PROT UR STRIP.AUTO-MCNC: ABNORMAL MG/DL
PROTHROMBIN TIME: 11.5 SECONDS (ref 9.8–12.4)
RBC # BLD AUTO: 3.56 X10*6/UL (ref 4–5.2)
RBC # UR STRIP.AUTO: ABNORMAL MG/DL
RBC #/AREA URNS AUTO: ABNORMAL /HPF
SODIUM SERPL-SCNC: 141 MMOL/L (ref 136–145)
SP GR UR STRIP.AUTO: 1.01
UROBILINOGEN UR STRIP.AUTO-MCNC: NORMAL MG/DL
WBC # BLD AUTO: 6.4 X10*3/UL (ref 4.4–11.3)
WBC #/AREA URNS AUTO: ABNORMAL /HPF

## 2025-05-19 PROCEDURE — 99285 EMERGENCY DEPT VISIT HI MDM: CPT | Mod: 25 | Performed by: EMERGENCY MEDICINE

## 2025-05-19 PROCEDURE — 94640 AIRWAY INHALATION TREATMENT: CPT

## 2025-05-19 PROCEDURE — 74176 CT ABD & PELVIS W/O CONTRAST: CPT | Performed by: RADIOLOGY

## 2025-05-19 PROCEDURE — 2500000002 HC RX 250 W HCPCS SELF ADMINISTERED DRUGS (ALT 637 FOR MEDICARE OP, ALT 636 FOR OP/ED): Performed by: PHYSICIAN ASSISTANT

## 2025-05-19 PROCEDURE — 87899 AGENT NOS ASSAY W/OPTIC: CPT | Mod: PARLAB | Performed by: PHYSICIAN ASSISTANT

## 2025-05-19 PROCEDURE — 80053 COMPREHEN METABOLIC PANEL: CPT | Performed by: EMERGENCY MEDICINE

## 2025-05-19 PROCEDURE — 74176 CT ABD & PELVIS W/O CONTRAST: CPT

## 2025-05-19 PROCEDURE — 87449 NOS EACH ORGANISM AG IA: CPT | Mod: PARLAB | Performed by: PHYSICIAN ASSISTANT

## 2025-05-19 PROCEDURE — 2500000005 HC RX 250 GENERAL PHARMACY W/O HCPCS: Performed by: EMERGENCY MEDICINE

## 2025-05-19 PROCEDURE — 70450 CT HEAD/BRAIN W/O DYE: CPT

## 2025-05-19 PROCEDURE — 2500000004 HC RX 250 GENERAL PHARMACY W/ HCPCS (ALT 636 FOR OP/ED): Mod: JZ | Performed by: EMERGENCY MEDICINE

## 2025-05-19 PROCEDURE — 94799 UNLISTED PULMONARY SVC/PX: CPT

## 2025-05-19 PROCEDURE — 96361 HYDRATE IV INFUSION ADD-ON: CPT

## 2025-05-19 PROCEDURE — 72125 CT NECK SPINE W/O DYE: CPT

## 2025-05-19 PROCEDURE — 82947 ASSAY GLUCOSE BLOOD QUANT: CPT

## 2025-05-19 PROCEDURE — 71250 CT THORAX DX C-: CPT | Performed by: RADIOLOGY

## 2025-05-19 PROCEDURE — 99223 1ST HOSP IP/OBS HIGH 75: CPT | Performed by: PHYSICIAN ASSISTANT

## 2025-05-19 PROCEDURE — 5A0945A ASSISTANCE WITH RESPIRATORY VENTILATION, 24-96 CONSECUTIVE HOURS, HIGH NASAL FLOW/VELOCITY: ICD-10-PCS | Performed by: EMERGENCY MEDICINE

## 2025-05-19 PROCEDURE — 2500000004 HC RX 250 GENERAL PHARMACY W/ HCPCS (ALT 636 FOR OP/ED): Mod: JZ | Performed by: PHYSICIAN ASSISTANT

## 2025-05-19 PROCEDURE — 83605 ASSAY OF LACTIC ACID: CPT | Performed by: EMERGENCY MEDICINE

## 2025-05-19 PROCEDURE — 2500000001 HC RX 250 WO HCPCS SELF ADMINISTERED DRUGS (ALT 637 FOR MEDICARE OP): Performed by: PHYSICIAN ASSISTANT

## 2025-05-19 PROCEDURE — 85025 COMPLETE CBC W/AUTO DIFF WBC: CPT | Performed by: EMERGENCY MEDICINE

## 2025-05-19 PROCEDURE — 1100000001 HC PRIVATE ROOM DAILY

## 2025-05-19 PROCEDURE — 96365 THER/PROPH/DIAG IV INF INIT: CPT

## 2025-05-19 PROCEDURE — 36415 COLL VENOUS BLD VENIPUNCTURE: CPT | Performed by: EMERGENCY MEDICINE

## 2025-05-19 PROCEDURE — 70450 CT HEAD/BRAIN W/O DYE: CPT | Performed by: RADIOLOGY

## 2025-05-19 PROCEDURE — 2500000002 HC RX 250 W HCPCS SELF ADMINISTERED DRUGS (ALT 637 FOR MEDICARE OP, ALT 636 FOR OP/ED): Mod: JZ | Performed by: INTERNAL MEDICINE

## 2025-05-19 PROCEDURE — 72125 CT NECK SPINE W/O DYE: CPT | Performed by: RADIOLOGY

## 2025-05-19 PROCEDURE — 2500000004 HC RX 250 GENERAL PHARMACY W/ HCPCS (ALT 636 FOR OP/ED): Mod: JZ | Performed by: INTERNAL MEDICINE

## 2025-05-19 PROCEDURE — 87040 BLOOD CULTURE FOR BACTERIA: CPT | Mod: PARLAB | Performed by: EMERGENCY MEDICINE

## 2025-05-19 PROCEDURE — 81001 URINALYSIS AUTO W/SCOPE: CPT | Performed by: EMERGENCY MEDICINE

## 2025-05-19 PROCEDURE — 82077 ASSAY SPEC XCP UR&BREATH IA: CPT | Performed by: EMERGENCY MEDICINE

## 2025-05-19 PROCEDURE — 85610 PROTHROMBIN TIME: CPT | Performed by: EMERGENCY MEDICINE

## 2025-05-19 RX ORDER — IPRATROPIUM BROMIDE AND ALBUTEROL SULFATE 2.5; .5 MG/3ML; MG/3ML
3 SOLUTION RESPIRATORY (INHALATION)
Status: DISCONTINUED | OUTPATIENT
Start: 2025-05-19 | End: 2025-05-21 | Stop reason: HOSPADM

## 2025-05-19 RX ORDER — METOPROLOL TARTRATE 100 MG/1
100 TABLET ORAL 2 TIMES DAILY
Status: DISCONTINUED | OUTPATIENT
Start: 2025-05-19 | End: 2025-05-21 | Stop reason: HOSPADM

## 2025-05-19 RX ORDER — ACETYLCYSTEINE 100 MG/ML
6 SOLUTION ORAL; RESPIRATORY (INHALATION)
Status: DISCONTINUED | OUTPATIENT
Start: 2025-05-20 | End: 2025-05-19

## 2025-05-19 RX ORDER — AZITHROMYCIN 250 MG/1
500 TABLET, FILM COATED ORAL DAILY
Status: COMPLETED | OUTPATIENT
Start: 2025-05-20 | End: 2025-05-20

## 2025-05-19 RX ORDER — HYDRALAZINE HYDROCHLORIDE 20 MG/ML
10 INJECTION INTRAMUSCULAR; INTRAVENOUS ONCE
Status: COMPLETED | OUTPATIENT
Start: 2025-05-19 | End: 2025-05-19

## 2025-05-19 RX ORDER — IPRATROPIUM BROMIDE AND ALBUTEROL SULFATE 2.5; .5 MG/3ML; MG/3ML
3 SOLUTION RESPIRATORY (INHALATION)
Status: DISCONTINUED | OUTPATIENT
Start: 2025-05-19 | End: 2025-05-19

## 2025-05-19 RX ORDER — ACETAMINOPHEN 160 MG/5ML
650 SOLUTION ORAL EVERY 4 HOURS PRN
Status: DISCONTINUED | OUTPATIENT
Start: 2025-05-19 | End: 2025-05-21 | Stop reason: HOSPADM

## 2025-05-19 RX ORDER — INSULIN LISPRO 100 [IU]/ML
0-5 INJECTION, SOLUTION INTRAVENOUS; SUBCUTANEOUS
Status: DISCONTINUED | OUTPATIENT
Start: 2025-05-19 | End: 2025-05-21 | Stop reason: HOSPADM

## 2025-05-19 RX ORDER — ACETYLCYSTEINE 100 MG/ML
6 SOLUTION ORAL; RESPIRATORY (INHALATION)
Status: DISCONTINUED | OUTPATIENT
Start: 2025-05-19 | End: 2025-05-21 | Stop reason: HOSPADM

## 2025-05-19 RX ORDER — LOSARTAN POTASSIUM 50 MG/1
100 TABLET ORAL DAILY
Status: DISCONTINUED | OUTPATIENT
Start: 2025-05-20 | End: 2025-05-21 | Stop reason: HOSPADM

## 2025-05-19 RX ORDER — AZITHROMYCIN 250 MG/1
250 TABLET, FILM COATED ORAL DAILY
Status: DISCONTINUED | OUTPATIENT
Start: 2025-05-21 | End: 2025-05-20

## 2025-05-19 RX ORDER — FUROSEMIDE 20 MG/1
10 TABLET ORAL DAILY
Status: DISCONTINUED | OUTPATIENT
Start: 2025-05-20 | End: 2025-05-21 | Stop reason: HOSPADM

## 2025-05-19 RX ORDER — DEXTROSE 50 % IN WATER (D50W) INTRAVENOUS SYRINGE
25
Status: DISCONTINUED | OUTPATIENT
Start: 2025-05-19 | End: 2025-05-21 | Stop reason: HOSPADM

## 2025-05-19 RX ORDER — FUROSEMIDE 10 MG/ML
20 INJECTION INTRAMUSCULAR; INTRAVENOUS ONCE
Status: COMPLETED | OUTPATIENT
Start: 2025-05-19 | End: 2025-05-19

## 2025-05-19 RX ORDER — ACETYLCYSTEINE 100 MG/ML
6 SOLUTION ORAL; RESPIRATORY (INHALATION) 4 TIMES DAILY
Status: DISCONTINUED | OUTPATIENT
Start: 2025-05-19 | End: 2025-05-19

## 2025-05-19 RX ORDER — ACETAMINOPHEN 650 MG/1
650 SUPPOSITORY RECTAL EVERY 4 HOURS PRN
Status: DISCONTINUED | OUTPATIENT
Start: 2025-05-19 | End: 2025-05-21 | Stop reason: HOSPADM

## 2025-05-19 RX ORDER — IPRATROPIUM BROMIDE AND ALBUTEROL SULFATE 2.5; .5 MG/3ML; MG/3ML
3 SOLUTION RESPIRATORY (INHALATION) EVERY 4 HOURS PRN
Status: DISCONTINUED | OUTPATIENT
Start: 2025-05-19 | End: 2025-05-21 | Stop reason: HOSPADM

## 2025-05-19 RX ORDER — ACETAMINOPHEN 325 MG/1
650 TABLET ORAL EVERY 4 HOURS PRN
Status: DISCONTINUED | OUTPATIENT
Start: 2025-05-19 | End: 2025-05-21 | Stop reason: HOSPADM

## 2025-05-19 RX ORDER — AMLODIPINE BESYLATE 10 MG/1
10 TABLET ORAL DAILY
Status: DISCONTINUED | OUTPATIENT
Start: 2025-05-19 | End: 2025-05-21 | Stop reason: HOSPADM

## 2025-05-19 RX ORDER — DEXTROSE 50 % IN WATER (D50W) INTRAVENOUS SYRINGE
12.5
Status: DISCONTINUED | OUTPATIENT
Start: 2025-05-19 | End: 2025-05-21 | Stop reason: HOSPADM

## 2025-05-19 RX ORDER — DIAZEPAM 5 MG/ML
2 INJECTION, SOLUTION INTRAMUSCULAR; INTRAVENOUS ONCE
Status: COMPLETED | OUTPATIENT
Start: 2025-05-20 | End: 2025-05-19

## 2025-05-19 RX ADMIN — Medication 70 PERCENT: at 11:21

## 2025-05-19 RX ADMIN — HYDRALAZINE HYDROCHLORIDE 10 MG: 20 INJECTION INTRAMUSCULAR; INTRAVENOUS at 16:00

## 2025-05-19 RX ADMIN — INSULIN LISPRO 3 UNITS: 100 INJECTION, SOLUTION INTRAVENOUS; SUBCUTANEOUS at 18:40

## 2025-05-19 RX ADMIN — AZITHROMYCIN MONOHYDRATE 500 MG: 500 INJECTION, POWDER, LYOPHILIZED, FOR SOLUTION INTRAVENOUS at 16:08

## 2025-05-19 RX ADMIN — Medication 60 L/MIN: at 20:00

## 2025-05-19 RX ADMIN — PIPERACILLIN SODIUM AND TAZOBACTAM SODIUM 2.25 G: 2; .25 INJECTION, SOLUTION INTRAVENOUS at 14:49

## 2025-05-19 RX ADMIN — ACETYLCYSTEINE 4 ML: 100 SOLUTION ORAL; RESPIRATORY (INHALATION) at 19:10

## 2025-05-19 RX ADMIN — IPRATROPIUM BROMIDE AND ALBUTEROL SULFATE 3 ML: .5; 3 SOLUTION RESPIRATORY (INHALATION) at 19:09

## 2025-05-19 RX ADMIN — FUROSEMIDE 20 MG: 10 INJECTION, SOLUTION INTRAVENOUS at 18:40

## 2025-05-19 RX ADMIN — DIAZEPAM 2 MG: 5 INJECTION INTRAMUSCULAR; INTRAVENOUS at 22:38

## 2025-05-19 RX ADMIN — METOPROLOL TARTRATE 100 MG: 100 TABLET, FILM COATED ORAL at 22:00

## 2025-05-19 RX ADMIN — AMLODIPINE BESYLATE 10 MG: 10 TABLET ORAL at 16:14

## 2025-05-19 RX ADMIN — SODIUM CHLORIDE, SODIUM LACTATE, POTASSIUM CHLORIDE, AND CALCIUM CHLORIDE 1000 ML: .6; .31; .03; .02 INJECTION, SOLUTION INTRAVENOUS at 12:13

## 2025-05-19 RX ADMIN — PIPERACILLIN SODIUM AND TAZOBACTAM SODIUM 2.25 G: 2; .25 INJECTION, SOLUTION INTRAVENOUS at 17:56

## 2025-05-19 SDOH — SOCIAL STABILITY: SOCIAL INSECURITY: WERE YOU ABLE TO COMPLETE ALL THE BEHAVIORAL HEALTH SCREENINGS?: YES

## 2025-05-19 SDOH — SOCIAL STABILITY: SOCIAL INSECURITY: HAS ANYONE EVER THREATENED TO HURT YOUR FAMILY OR YOUR PETS?: NO

## 2025-05-19 SDOH — ECONOMIC STABILITY: FOOD INSECURITY: WITHIN THE PAST 12 MONTHS, YOU WORRIED THAT YOUR FOOD WOULD RUN OUT BEFORE YOU GOT THE MONEY TO BUY MORE.: NEVER TRUE

## 2025-05-19 SDOH — SOCIAL STABILITY: SOCIAL INSECURITY: WITHIN THE LAST YEAR, HAVE YOU BEEN HUMILIATED OR EMOTIONALLY ABUSED IN OTHER WAYS BY YOUR PARTNER OR EX-PARTNER?: NO

## 2025-05-19 SDOH — SOCIAL STABILITY: SOCIAL INSECURITY: ARE THERE ANY APPARENT SIGNS OF INJURIES/BEHAVIORS THAT COULD BE RELATED TO ABUSE/NEGLECT?: NO

## 2025-05-19 SDOH — SOCIAL STABILITY: SOCIAL INSECURITY: ARE YOU OR HAVE YOU BEEN THREATENED OR ABUSED PHYSICALLY, EMOTIONALLY, OR SEXUALLY BY ANYONE?: NO

## 2025-05-19 SDOH — ECONOMIC STABILITY: INCOME INSECURITY: IN THE PAST 12 MONTHS HAS THE ELECTRIC, GAS, OIL, OR WATER COMPANY THREATENED TO SHUT OFF SERVICES IN YOUR HOME?: NO

## 2025-05-19 SDOH — SOCIAL STABILITY: SOCIAL INSECURITY: DO YOU FEEL ANYONE HAS EXPLOITED OR TAKEN ADVANTAGE OF YOU FINANCIALLY OR OF YOUR PERSONAL PROPERTY?: NO

## 2025-05-19 SDOH — SOCIAL STABILITY: SOCIAL INSECURITY: WITHIN THE LAST YEAR, HAVE YOU BEEN AFRAID OF YOUR PARTNER OR EX-PARTNER?: NO

## 2025-05-19 SDOH — ECONOMIC STABILITY: FOOD INSECURITY: WITHIN THE PAST 12 MONTHS, THE FOOD YOU BOUGHT JUST DIDN'T LAST AND YOU DIDN'T HAVE MONEY TO GET MORE.: NEVER TRUE

## 2025-05-19 SDOH — SOCIAL STABILITY: SOCIAL INSECURITY: HAVE YOU HAD THOUGHTS OF HARMING ANYONE ELSE?: NO

## 2025-05-19 SDOH — SOCIAL STABILITY: SOCIAL INSECURITY: DO YOU FEEL UNSAFE GOING BACK TO THE PLACE WHERE YOU ARE LIVING?: NO

## 2025-05-19 SDOH — SOCIAL STABILITY: SOCIAL INSECURITY: ABUSE: ADULT

## 2025-05-19 SDOH — SOCIAL STABILITY: SOCIAL INSECURITY: HAVE YOU HAD ANY THOUGHTS OF HARMING ANYONE ELSE?: NO

## 2025-05-19 SDOH — SOCIAL STABILITY: SOCIAL INSECURITY: DOES ANYONE TRY TO KEEP YOU FROM HAVING/CONTACTING OTHER FRIENDS OR DOING THINGS OUTSIDE YOUR HOME?: NO

## 2025-05-19 ASSESSMENT — COGNITIVE AND FUNCTIONAL STATUS - GENERAL
EATING MEALS: A LITTLE
MOVING TO AND FROM BED TO CHAIR: A LOT
PERSONAL GROOMING: A LITTLE
PATIENT BASELINE BEDBOUND: NO
TOILETING: A LITTLE
WALKING IN HOSPITAL ROOM: A LOT
MOBILITY SCORE: 14
DAILY ACTIVITIY SCORE: 18
DRESSING REGULAR LOWER BODY CLOTHING: A LITTLE
MOVING FROM LYING ON BACK TO SITTING ON SIDE OF FLAT BED WITH BEDRAILS: A LITTLE
DRESSING REGULAR UPPER BODY CLOTHING: A LITTLE
HELP NEEDED FOR BATHING: A LITTLE
CLIMB 3 TO 5 STEPS WITH RAILING: A LOT
TURNING FROM BACK TO SIDE WHILE IN FLAT BAD: A LITTLE
STANDING UP FROM CHAIR USING ARMS: A LOT

## 2025-05-19 ASSESSMENT — LIFESTYLE VARIABLES
HOW OFTEN DO YOU HAVE A DRINK CONTAINING ALCOHOL: NEVER
HOW OFTEN DO YOU HAVE 6 OR MORE DRINKS ON ONE OCCASION: NEVER
PRESCIPTION_ABUSE_PAST_12_MONTHS: NO
HOW MANY STANDARD DRINKS CONTAINING ALCOHOL DO YOU HAVE ON A TYPICAL DAY: PATIENT DOES NOT DRINK
SKIP TO QUESTIONS 9-10: 1
SUBSTANCE_ABUSE_PAST_12_MONTHS: NO
AUDIT-C TOTAL SCORE: 0
AUDIT-C TOTAL SCORE: 0

## 2025-05-19 ASSESSMENT — ACTIVITIES OF DAILY LIVING (ADL)
LACK_OF_TRANSPORTATION: NO
TOILETING: NEEDS ASSISTANCE
JUDGMENT_ADEQUATE_SAFELY_COMPLETE_DAILY_ACTIVITIES: YES
HEARING - LEFT EAR: DIFFICULTY WITH NOISE
ASSISTIVE_DEVICE: WALKER
PATIENT'S MEMORY ADEQUATE TO SAFELY COMPLETE DAILY ACTIVITIES?: YES
ADEQUATE_TO_COMPLETE_ADL: YES
BATHING: NEEDS ASSISTANCE
GROOMING: INDEPENDENT
FEEDING YOURSELF: INDEPENDENT
HEARING - RIGHT EAR: DIFFICULTY WITH NOISE
WALKS IN HOME: NEEDS ASSISTANCE
DRESSING YOURSELF: NEEDS ASSISTANCE

## 2025-05-19 ASSESSMENT — COLUMBIA-SUICIDE SEVERITY RATING SCALE - C-SSRS
6. HAVE YOU EVER DONE ANYTHING, STARTED TO DO ANYTHING, OR PREPARED TO DO ANYTHING TO END YOUR LIFE?: NO
1. IN THE PAST MONTH, HAVE YOU WISHED YOU WERE DEAD OR WISHED YOU COULD GO TO SLEEP AND NOT WAKE UP?: NO
1. IN THE PAST MONTH, HAVE YOU WISHED YOU WERE DEAD OR WISHED YOU COULD GO TO SLEEP AND NOT WAKE UP?: NO
2. HAVE YOU ACTUALLY HAD ANY THOUGHTS OF KILLING YOURSELF?: NO
6. HAVE YOU EVER DONE ANYTHING, STARTED TO DO ANYTHING, OR PREPARED TO DO ANYTHING TO END YOUR LIFE?: NO
2. HAVE YOU ACTUALLY HAD ANY THOUGHTS OF KILLING YOURSELF?: NO

## 2025-05-19 ASSESSMENT — PATIENT HEALTH QUESTIONNAIRE - PHQ9
SUM OF ALL RESPONSES TO PHQ9 QUESTIONS 1 & 2: 0
2. FEELING DOWN, DEPRESSED OR HOPELESS: NOT AT ALL
1. LITTLE INTEREST OR PLEASURE IN DOING THINGS: NOT AT ALL

## 2025-05-19 ASSESSMENT — PAIN SCALES - GENERAL
PAINLEVEL_OUTOF10: 0 - NO PAIN
PAINLEVEL_OUTOF10: 0 - NO PAIN

## 2025-05-19 ASSESSMENT — PAIN - FUNCTIONAL ASSESSMENT: PAIN_FUNCTIONAL_ASSESSMENT: 0-10

## 2025-05-19 NOTE — PROGRESS NOTES
Pharmacy Medication History Review    Nae Samayoa is a 100 y.o. female admitted for No Principal Problem: There is no principal problem currently on the Problem List. Please update the Problem List and refresh.. Pharmacy reviewed the patient's [id number] medications and allergies for accuracy.    The list below reflectives the updated PTA list. Please review each medication in order reconciliation for additional clarification and justification.  Prior to Admission medications    Medication Sig Start Date End Date Taking? Authorizing Provider   amLODIPine (Norvasc) 10 mg tablet Take 1 tablet (10 mg) by mouth once daily. 5/18/25  Yes Johny Winter, DO   furosemide (Lasix) 20 mg tablet Take 0.5 tablets (10 mg) by mouth once daily. Do not fill before May 19, 2025. 5/19/25  Yes Johny Winter DO   losartan (Cozaar) 100 mg tablet Take 1 tablet (100 mg) by mouth once daily. 5/19/25  yes Johny Winter, DO   metoprolol tartrate (Lopressor) 100 mg tablet Take 1 tablet (100 mg) by mouth 2 times a day. 5/18/25  yes Johny Winter, DO   amLODIPine (Norvasc) 10 mg tablet Take 1 tablet (10 mg) by mouth once daily.  Patient not taking: Reported on 5/14/2025 3/10/25 5/18/25  Historical Provider, MD   dorzolamide-timoloL (Cosopt) 22.3-6.8 mg/mL ophthalmic solution 1 drop every 12 hours. 1/2/25 5/18/25  Historical Provider, MD   metoprolol tartrate (Lopressor) 50 mg tablet Take 1 tablet by mouth 2 times a day. 1/23/25 5/18/25  Historical Provider, MD   Rocklatan 0.02-0.005 % drops Administer 1 drop into both eyes once daily at bedtime. 1/2/25 5/18/25  Historical Provider, MD   simvastatin (Zocor) 80 mg tablet Take 1 tablet (80 mg) by mouth once daily at bedtime. 2/12/25 5/18/25  Historical Provider, MD   zolpidem (Ambien) 10 mg tablet Take 1 tablet (10 mg) by mouth once daily at bedtime. 12/18/24 5/18/25  Historical Provider, MD        The list below reflectives the updated allergy list. Please review  each documented allergy for additional clarification and justification.  Allergies  Reviewed by Elen San MD on 5/19/2025   No Known Allergies         Below are additional concerns with the patient's PTA list.      Doretha Echevarria

## 2025-05-19 NOTE — H&P
History Of Present Illness  Nae Samayoa is a 100 y.o. female with past medical history significant for HTN, new onset CHF, and CKD who presents to the ED from home via EMS for evaluation of a syncopal episode.  Son at bedside helps with supplemental information.  Reports he was feeding her breakfast and patient was eating a bagel when he noticed her decreased responsiveness and thinks she may have choked on it.  Says he lowered her to the floor and attempted the Heimlich maneuver.  Denies her striking her head or falling.  Says the Heimlich was unsuccessful and noticed her respirations were slowing so he started mouth-to-mouth on her.  States he noticed her color returning to her face slightly and then the police/EMS arrived and took over. EMS reports patient had decreased responsiveness and was also hypoxic on arrival.  Patient much more alert and oriented currently in the ED.  Is able to tell me that she choked on a bagel earlier, able to tell me where she is at in her full name/date of birth.  Denies any symptoms and does not have any complaints at this time.  Denies any trouble breathing.  Denies any chest pains.  Denies headaches, dizziness, shortness of breath, abdominal pain or nausea, or fever/chills.  States she has been doing okay at home, lives with her son.  Ambulates without assistance.  Denies any recent antibiotic use.  Patient with recent admission on 5/14 for new onset congestive heart failure, and CODE STATUS at that time was discussed and patient and son ultimately decided on DNR comfort care.    ED course: On arrival to the ED, patient afebrile, hypertensive with blood pressure 216/96, hypoxic with SpO2 91% on supplemental oxygen, respirations 18, and pulse 71.  Glucose 257, electrolytes WNL, BUN 28/creatinine 1.4, calcium 8.1, AST 84, lactate 2.  WBC 6.4, hemoglobin 10.7/hematocrit 34.8.  Platelets 316.  Urinalysis unremarkable.  CT head shows no acute intracranial abnormality or calvarial  fracture.  Shows atrophy and nonspecific low-density white matter changes, mild mucosal thickening or secretions paranasal sinuses, no acute fractures in the cervical spine, no acute paravertebral hematoma, multilevel degenerative changes, and scattered arterial vascular calcifications.  CT chest/abdomen/pelvis shows bilateral layering effusions with adjacent atelectasis and potential pneumonia, cardiomegaly, scattered arterial vascular calcifications, potential multinodular thyroid gland, multifocal probably benign renal hypodensities favoring cysts and no acute significant traumatic abnormalities in abdomen/pelvis.  Patient given 1 L LR bolus and Zosyn in the ED.    Admitting provider is Dr. Mcallister     Past Medical History  Medical History[1]    Surgical History  Surgical History[2]     Social History  She reports that she has never smoked. She has never used smokeless tobacco. No history on file for alcohol use and drug use.    Family History  Family History[3]     Allergies  Patient has no known allergies.    Review of Systems  10 point review of system negative except as noted above in HPI    Physical Exam  Constitutional:       General: She is not in acute distress.     Appearance: She is ill-appearing (Chronically). She is not toxic-appearing.      Comments: Son at bedside   HENT:      Head: Normocephalic and atraumatic.      Mouth/Throat:      Mouth: Mucous membranes are dry.      Pharynx: Oropharynx is clear.   Eyes:      Conjunctiva/sclera: Conjunctivae normal.   Cardiovascular:      Rate and Rhythm: Normal rate and regular rhythm.      Pulses: Normal pulses.      Heart sounds: Normal heart sounds.      Comments: Hypertensive  Pulmonary:      Effort: Pulmonary effort is normal.      Breath sounds: No wheezing.      Comments: Currently on high flow oxygen via nasal cannula, SpO2 100%.  Diminished breath sounds.  No rhonchi or wheezing.  Symmetric chest expansion.  No conversational dyspnea.  Abdominal:       General: Bowel sounds are normal. There is no distension.      Palpations: Abdomen is soft.      Tenderness: There is no abdominal tenderness.   Musculoskeletal:         General: No tenderness. Normal range of motion.      Right lower leg: No edema.      Left lower leg: No edema.   Skin:     General: Skin is warm and dry.   Neurological:      General: No focal deficit present.      Mental Status: She is alert and oriented to person, place, and time.   Psychiatric:         Mood and Affect: Mood normal.         Behavior: Behavior normal.       Last Recorded Vitals  Blood pressure (!) 204/86, pulse 66, temperature 35.8 °C (96.4 °F), temperature source Temporal, resp. rate 16, SpO2 100%.    Relevant Results  Results for orders placed or performed during the hospital encounter of 05/19/25 (from the past 24 hours)   POCT GLUCOSE   Result Value Ref Range    POCT Glucose 257 (H) 74 - 99 mg/dL   CBC and Auto Differential   Result Value Ref Range    WBC 6.4 4.4 - 11.3 x10*3/uL    nRBC 0.0 0.0 - 0.0 /100 WBCs    RBC 3.56 (L) 4.00 - 5.20 x10*6/uL    Hemoglobin 10.7 (L) 12.0 - 16.0 g/dL    Hematocrit 34.8 (L) 36.0 - 46.0 %    MCV 98 80 - 100 fL    MCH 30.1 26.0 - 34.0 pg    MCHC 30.7 (L) 32.0 - 36.0 g/dL    RDW 14.6 (H) 11.5 - 14.5 %    Platelets 316 150 - 450 x10*3/uL    Neutrophils % 45.0 40.0 - 80.0 %    Immature Granulocytes %, Automated 0.3 0.0 - 0.9 %    Lymphocytes % 44.6 13.0 - 44.0 %    Monocytes % 8.1 2.0 - 10.0 %    Eosinophils % 1.1 0.0 - 6.0 %    Basophils % 0.9 0.0 - 2.0 %    Neutrophils Absolute 2.87 1.60 - 5.50 x10*3/uL    Immature Granulocytes Absolute, Automated 0.02 0.00 - 0.50 x10*3/uL    Lymphocytes Absolute 2.85 0.80 - 3.00 x10*3/uL    Monocytes Absolute 0.52 0.05 - 0.80 x10*3/uL    Eosinophils Absolute 0.07 0.00 - 0.40 x10*3/uL    Basophils Absolute 0.06 0.00 - 0.10 x10*3/uL   Comprehensive Metabolic Panel   Result Value Ref Range    Glucose 257 (H) 74 - 99 mg/dL    Sodium 141 136 - 145 mmol/L    Potassium  3.9 3.5 - 5.3 mmol/L    Chloride 106 98 - 107 mmol/L    Bicarbonate 25 21 - 32 mmol/L    Anion Gap 14 10 - 20 mmol/L    Urea Nitrogen 28 (H) 6 - 23 mg/dL    Creatinine 1.40 (H) 0.50 - 1.05 mg/dL    eGFR 34 (L) >60 mL/min/1.73m*2    Calcium 8.1 (L) 8.6 - 10.3 mg/dL    Albumin 3.2 (L) 3.4 - 5.0 g/dL    Alkaline Phosphatase 98 33 - 136 U/L    Total Protein 6.5 6.4 - 8.2 g/dL    AST 84 (H) 9 - 39 U/L    Bilirubin, Total 0.7 0.0 - 1.2 mg/dL    ALT 34 7 - 45 U/L   Alcohol   Result Value Ref Range    Alcohol <10 <=10 mg/dL   Lactate   Result Value Ref Range    Lactate 2.0 0.4 - 2.0 mmol/L   Protime-INR   Result Value Ref Range    Protime 11.5 9.8 - 12.4 seconds    INR 1.0 0.9 - 1.1   Urinalysis with Reflex Microscopic   Result Value Ref Range    Color, Urine Colorless (N) Light-Yellow, Yellow, Dark-Yellow    Appearance, Urine Clear Clear    Specific Gravity, Urine 1.009 1.005 - 1.035    pH, Urine 7.0 5.0, 5.5, 6.0, 6.5, 7.0, 7.5, 8.0    Protein, Urine 100 (2+) (A) NEGATIVE, 10 (TRACE), 20 (TRACE) mg/dL    Glucose, Urine 100 (1+) (A) Normal mg/dL    Blood, Urine 0.03 (TRACE) (A) NEGATIVE mg/dL    Ketones, Urine NEGATIVE NEGATIVE mg/dL    Bilirubin, Urine NEGATIVE NEGATIVE mg/dL    Urobilinogen, Urine Normal Normal mg/dL    Nitrite, Urine NEGATIVE NEGATIVE    Leukocyte Esterase, Urine NEGATIVE NEGATIVE   Microscopic Only, Urine   Result Value Ref Range    WBC, Urine 1-5 1-5, NONE /HPF    RBC, Urine 1-2 NONE, 1-2, 3-5 /HPF    Bacteria, Urine 1+ (A) NONE SEEN /HPF    Mucus, Urine FEW Reference range not established. /LPF      CT chest abdomen pelvis wo IV contrast  Result Date: 5/19/2025  Interpreted By:  Noel Johns, STUDY: CT CHEST ABDOMEN PELVIS WO CONTRAST;  5/19/2025 11:59 am   INDICATION: Signs/Symptoms:injury.     COMPARISON: May 16, 2025 chest radiograph. Same day CT cervical spine.   ACCESSION NUMBER(S): [id number]   ORDERING CLINICIAN: JAKE WADDELL   TECHNIQUE: CT of the chest, abdomen and pelvis was  performed. Contiguous axial images were obtained at 3 mm slice thickness through the chest, abdomen and pelvis. Coronal and sagittal reconstructions at 3 mm slice thickness were performed.  No intravenous or oral contrast agents were administered.   FINDINGS: Please note that the study is limited without intravenous contrast.   CHEST:     LUNG/PLEURA/LARGE AIRWAYS: Moderate-sized layering bilateral simple fluid density pleural effusions with adjacent opacities ranging from airspace to ground-glass favoring combination of pneumonia and atelectasis. No evident ingris edema.   CARDIOVASCULAR STRUCTURES: Mild cardiomegaly. Extensive arterial vascular calcifications including coronary artery calcifications. Trace pericardial effusion. No aneurysm.   MEDIASTINUM AND PAZ: No mediastinal, hilar or axillary lymph nodes are present.  The esophagus demonstrates no significant abnormality.   CHEST WALL AND LOWER NECK: Heterogeneous potentially nodular thyroid gland although poorly assessed. No significant body wall hematoma.   ABDOMEN:   LIVER: No significant abnormality.   BILE DUCTS: Bile ducts: Normal caliber.   GALLBLADDER: The gallbladder is not distended and without calcified stones.   PANCREAS: No significant abnormality.   SPLEEN: No significant abnormality.   ADRENAL GLANDS: No significant abnormality.   KIDNEYS AND URETERS: Multifocal bilateral renal marginated nodular observations favor cysts although some are too small to characterize including 3 cm nodular observation upper portion left kidney image 201/81. No hydroureteronephrosis or nephroureterolithiasis is present.   PELVIS:   BLADDER: No significant abnormality.   REPRODUCTIVE ORGANS: No significant abnormality.   BOWEL: Nondilated without hematoma or surrounding fluid. No pneumatosis. Normal caliber appendix.   VESSELS: Extensive arterial vascular calcifications without aneurysm.   PERITONEUM/RETROPERITONEUM/LYMPH NODES: No ascites or free air, no fluid  collection.  The retroperitoneum appears unremarkable.  No abdominopelvic lymphadenopathy is present.   ABDOMINAL WALL: Tiny fat containing umbilical hernia. No evidence sizable hematoma.   BONES: Osseous demineralization limits the accuracy of the assessment for acute fracture. No acute displaced fracture. There are scattered degenerative changes. No suspicious osseous lesions.       Chest 1.  Bilateral layering effusions with adjacent atelectasis and potential pneumonia. 2. Cardiomegaly. 3. Scattered arterial vascular calcifications. 4. Potential multinodular thyroid gland although poorly assessed.   Abdomen-Pelvis 1.  No acute significant traumatic abnormality. 2. Multifocal probably benign renal hypodensities favoring cysts although some are too small to characterize.     MACRO: Incidental Finding:  A small focal renal lesion seen, too small to further characterize, but given the homogeneous attenuation likely represent benign etiology/simple cyst.  (**-YCF-**)   Instructions:  No further workup is needed. (Marielena BR, Christi SG, Tito NM, et al. Management of the Incidental Renal Mass on CT: A White Paper of the ACR Incidental Findings Committee. J Am Aryan Radiol. 2018;15(2):264-273.) [email or web address]   Signed by: Noel Johns 5/19/2025 12:28 PM Dictation workstation:   [id number]    CT head W O contrast trauma protocol  Result Date: 5/19/2025  Interpreted By:  Noel Johns, STUDY: CT CERVICAL SPINE WO IV CONTRAST; CT HEAD W/O CONTRAST TRAUMA PROTOCOL;  5/19/2025 11:59 am   INDICATION: Trauma. Signs/Symptoms:injury.     COMPARISON: Same day CT chest abdomen and pelvis. Refer to separate same day CT chest abdomen and pelvis report for results regarding that portion.   ACCESSION NUMBER(S): [id number]; [id number]   ORDERING CLINICIAN: JAKE WADDELL   TECHNIQUE: Axial noncontrast head and cervical spine CT   FINDINGS: Head: The ventricles, cisterns and sulci are prominent, consistent with  diffuse volume loss. There are areas of nonspecific white matter hypodensity, which are probably age-related or microvascular in nature.   Gray-white differentiation is intact and there is no evidence of acute cortical infarct. No mass, mass effect or midline shift is seen. There is no evidence of hemorrhage.   Visualized paranasal sinuses demonstrate trace secretions or mucosal thickening.   Calvarium: No acute fracture.   Cervical spine:   Alignment: About 2 mm C4, C6 and C7 anterolisthesis.   Craniocervical junction: Intact.   Fracture: No acute fracture.   Vertebra and intervertebral disc spaces: Mild-to-moderate multilevel discogenic degenerative changes most pronounced C5-6. Moderate to advanced multilevel facet arthrosis.   Paravertebral soft tissues: No paravertebral hematoma. Scattered arterial vascular calcifications.     Brain Injury (BIG) guidelines CT values:   Skull fracture: No SDH (subdural hematoma): None detected EDH (epidural hemtoma): None detected IPH (intraparenchymal hemorrhage): None detected SAH (subarachnoid hemorrhage): None detected IVH (intraventricular hemorrhage): No   Reference: Ham MARTINEZ, Abigail RS, Amanda M, et al. The BIG (brain injury guidelines) project: defining the management of traumatic brain injury by acute care surgeons. J Trauma Acute Care Surg. 2014;76:920v273.       Head: No acute intracranial abnormality or calvarial fracture was identified.   Atrophy and nonspecific low-density white matter changes.   Mild mucosal thickening or secretions paranasal sinuses.   Cervical spine: About 2 mm C4, C6 and C7 anterolisthesis.   No acute fracture.   No acute paravertebral hematoma.   Multilevel degenerative changes.   Scattered arterial vascular calcifications.   MACRO: None   Signed by: Noel Johns 5/19/2025 12:17 PM Dictation workstation:   [id number]    CT cervical spine wo IV contrast  Result Date: 5/19/2025  Interpreted By:  Noel Johns, STUDY: CT CERVICAL SPINE WO  IV CONTRAST; CT HEAD W/O CONTRAST TRAUMA PROTOCOL;  5/19/2025 11:59 am   INDICATION: Trauma. Signs/Symptoms:injury.     COMPARISON: Same day CT chest abdomen and pelvis. Refer to separate same day CT chest abdomen and pelvis report for results regarding that portion.   ACCESSION NUMBER(S): [id number]; [id number]   ORDERING CLINICIAN: JAKE WADDELL   TECHNIQUE: Axial noncontrast head and cervical spine CT   FINDINGS: Head: The ventricles, cisterns and sulci are prominent, consistent with diffuse volume loss. There are areas of nonspecific white matter hypodensity, which are probably age-related or microvascular in nature.   Gray-white differentiation is intact and there is no evidence of acute cortical infarct. No mass, mass effect or midline shift is seen. There is no evidence of hemorrhage.   Visualized paranasal sinuses demonstrate trace secretions or mucosal thickening.   Calvarium: No acute fracture.   Cervical spine:   Alignment: About 2 mm C4, C6 and C7 anterolisthesis.   Craniocervical junction: Intact.   Fracture: No acute fracture.   Vertebra and intervertebral disc spaces: Mild-to-moderate multilevel discogenic degenerative changes most pronounced C5-6. Moderate to advanced multilevel facet arthrosis.   Paravertebral soft tissues: No paravertebral hematoma. Scattered arterial vascular calcifications.     Brain Injury (BIG) guidelines CT values:   Skull fracture: No SDH (subdural hematoma): None detected EDH (epidural hemtoma): None detected IPH (intraparenchymal hemorrhage): None detected SAH (subarachnoid hemorrhage): None detected IVH (intraventricular hemorrhage): No   Reference: Ham MARTINEZ, Abigail RS, Amanda M, et al. The BIG (brain injury guidelines) project: defining the management of traumatic brain injury by acute care surgeons. J Trauma Acute Care Surg. 2014;76:082i074.       Head: No acute intracranial abnormality or calvarial fracture was identified.   Atrophy and nonspecific low-density  white matter changes.   Mild mucosal thickening or secretions paranasal sinuses.   Cervical spine: About 2 mm C4, C6 and C7 anterolisthesis.   No acute fracture.   No acute paravertebral hematoma.   Multilevel degenerative changes.   Scattered arterial vascular calcifications.   MACRO: None   Signed by: Noel Johns 5/19/2025 12:17 PM Dictation workstation:   [id number]    Assessment & Plan  Aspiration pneumonia (Multi)    Hypoxia    HTN (hypertension)    CHF (congestive heart failure)    Hyperglycemia    Anemia    Encephalopathy      Nae Samayoa is a 100 y.o. female presents to the ED from home via EMS for evaluation of a syncopal episode.  Son at bedside helps with supplemental information.  Reports he was feeding her breakfast and patient was eating a bagel when he noticed her decreased responsiveness and thinks she may have choked on it.  Says he lowered her to the floor and attempted the Heimlich maneuver.  Denies her striking her head or falling.  Says the Heimlich was unsuccessful and noticed her respirations were slowing so he started mouth-to-mouth on her.  States he noticed her color returning to her face slightly and then the police/EMS arrived and took over. EMS reports patient had decreased responsiveness and was also hypoxic on arrival.  Patient much more alert and oriented currently in the ED.  Is able to tell me that she choked on a bagel earlier, able to tell me where she is at in her full name/date of birth.  Denies any symptoms and does not have any complaints at this time.  Denies any trouble breathing.  Denies any chest pains. Patient with recent admission on 5/14 for new onset congestive heart failure, and CODE STATUS at that time was discussed and patient and son ultimately decided on DNR comfort care.    CODE STATUS: DNR CC    #Suspect aspiration pneumonia  #Hypoxia  #Encephalopathy, improving  #Bilateral effusions, patient has new onset CHF  Admit as inpatient  No lactic acidosis, no  leukocytosis.  Urinalysis unremarkable.  Patient currently on high flow oxygen via nasal cannula, 75% FiO2.  SpO2 100%.  Pulmonology consult  -CT head and CT C-spine unremarkable for acute processes.  CT abdomen/pelvis unremarkable.  CT chest shows bilateral layering effusions with adjacent atelectasis and potential pneumonia, cardiomegaly, scattered arterial vascular calcifications.  Blood cultures pending  IV Zosyn, IV azithromycin (EKG pending to evaluate QTc)  Continuous oxygen, titrate back to baseline of 0 L  Tylenol, breathing treatments as needed  Swallow evaluation then okay for clear liquid diet, advance as tolerated  Fall precautions  Chest x-ray ordered for tomorrow  CBC, BMP, procalcitonin in the a.m.  Urine pneumonia antigens ordered  Q 4 vitals  PT/OT/speech therapy for evaluation and treatment    #Hypertensive  Patient ordered 10 mg IV hydralazine in the ED  Continue home oral antihypertensives  Monitor blood pressures  Son states he gave her all of her home medications this morning aside from amlodipine    #Hyperglycemia, 257 today  Sliding scale insulin  ACHS Accu-Cheks    #CKD  #Anemia  Creatinine 1.4 today, creatinine 1.48 on 5/18  Hemoglobin 10.7 today, hemoglobin 9.9 on 5/18.  No current signs of bleeding.  Monitor with a.m. labs    Continue home medications as appropriate    Chronic conditions:  HTN, CKD, CHF    #DVT prophylaxis  Ambulation as tolerated, SCDs    I spent 60 minutes in the professional and overall care of this patient.    Sebastián Avitia PA-C      Patient was seen and examined.  The son is at the bedside.  Patient was admitted with respiratory failure that was thought to be due to aspiration pneumonia.  She was on 60% Airvo but currently she is on room air, according to the nursing staff patient had taken self off of the oxygen during the night and on subsequent oxygen check her saturation was adequate.  Patient is awake and alert, oriented x 1-2.  Denies any discomfort.   Does not have any coughing.  Patient is going to be evaluated by the speech therapy shortly and if she passes will be started on a diet.  PT has evaluated patient and her mobility score was 12 patient was discharged from the hospital 3 days ago but 1 day post discharge patient was readmitted with the current diagnosis.  If continues to remain stable she should be able to be discharged when next 24 hours.  The family preferred to take the patient home.  Arrangements for home health care would be her made.         [1] No past medical history on file.  [2] No past surgical history on file.  [3] No family history on file.

## 2025-05-19 NOTE — ED TRIAGE NOTES
Patient to ER via EMS for unresponsiveness. Per EMS, pt was eating breakfast this morning when she fell backwards and became unresponsive. Placed on non-rebreather by EMS

## 2025-05-19 NOTE — CONSULTS
Pulmonary Critical Care note    Patient Name :Nae Samayoa  Date of service : 05/19/25  MRN: [id number]  YOB: 1924      Interval History:    History of Present Illness:      100 y.o. female  on day  0 of admission presenting with Aspiration pneumonia (Multi).  Past medical history of hypertension, new onset CHF, CKD, with presented to the ED after she developed a syncope, she was being fed her breakfast by her son while eating a bagel she developed decreased unresponsiveness and possible choking, no head injury but patient lost consciousness, patient's son performed Heimlich which was unsuccessful, started mouth-to-mouth, patient arrival to ED was hypoxic but became gradually more alert, she stated that she did actually choke on her food, CODE STATUS established as DNR ml, chest CT showed bibasilar infiltrates/bilateral pleural effusion  Cardiomegaly and mild atelectatic changes  Past Medical/Surgical History:    has no past medical history on file.   has no past surgical history on file.   reports that she has never smoked. She has never used smokeless tobacco. No history on file for alcohol use and drug use.  Family History:   No relevant family history has been documented for this patient.    Allergies:     Patient has no known allergies.      Social history:   reports that she has never smoked. She has never used smokeless tobacco.      Review of Systems:   General: denies weight gain, denies loss of appetite, fever, chills, night sweats.  HEENT: denies headaches, dizziness, head trauma, visual changes, eye pain, tinnitus, nosebleeds, hoarseness or throat pain    Respiratory: denies chest pain, dyspnea, cough and hemoptysis  Cardiovascular: denies orthopnea, paroxysmal nocturnal dyspnea, leg swelling, and previous heart attack.    Gastrointestinal: denies pain, nausea vomiting, diarrhea, constipation, melena or bleeding.  Genitourinary: denies hematuria, frequency, urgency or dysuria  Neurology:  denies syncope, seizures, paralysis, paraesthesia   Endocrine: denies polyuria, polydipsia, skin or hair changes, and heat or cold intolerance  Musculoskeletal: denies joint pain, swelling, arthritis or myalgia  Hematologic: denies bleeding, adenopathy and easy bruising  Skin: denies rashes and skin discoloration  Psychiatry: denies depression    Physical Exam:   Vital Signs:   Vitals:    05/19/25 1630   BP: (!) 187/75   Pulse: 71   Resp: (!) 21   Temp:    SpO2: 99%     There were no vitals filed for this visit.      Input/Output:  No intake or output data in the 24 hours ending 05/19/25 1656    Oxygen requirements:    Ventilator Information:  FiO2 (%):  [59 %-90 %] 59 %  Oxygen Therapy/Pulse Ox  Medical Gas Therapy: Supplemental oxygen  Medical Gas Delivery Method: Nasal Cannula via Capnography  FiO2 (%): 59 %  Pulse Ox: 99 %  Patient Activity During SpO2 Measurement: At rest  Temperature: 35.8 °C (96.4 °F)        General appearance: Not in pain or distress, in no respiratory distress    HEENT: Atraumatic/normocephalic, EOMI, ISABELA, pharynx clear, moist mucosa, redness of the uvula appreciated,   Neck: Supple, no jugular venous distension, lymphadenopathy, thyromegaly or carotid bruits  Chest: Patient is on Airvo, diminished bilateral breath sound with rhonchi  Cardiovascular: Normal S1, S2, regular rate and rhythm, no murmur, rub or gallop  Abdomen: Normal sounds present, soft, lax with no tenderness, no hepatosplenomegaly, and no masses  Extremities: No edema. Pulses are equally present.   Skin: intact, no rashes   Neurologic: Alert and oriented x 3, No focal deficit         Current Medications:   Scheduled medications  Scheduled Medications[1]  Continuous medications  Continuous Medications[2]  PRN medications  PRN Medications[3]      Investigations:  Labs, radiological imaging and cardiac work up were personally reviewed    Results from last 7 days   Lab Units 05/19/25  1139 05/18/25  0659 05/17/25  0655    SODIUM mmol/L 141 144 140   POTASSIUM mmol/L 3.9 3.9 3.9   CHLORIDE mmol/L 106 105 102   CO2 mmol/L 25 30 32   BUN mg/dL 28* 25* 21   CREATININE mg/dL 1.40* 1.48* 1.52*   GLUCOSE mg/dL 257* 96 105*   CALCIUM mg/dL 8.1* 8.0* 8.3*     Results from last 7 days   Lab Units 05/19/25  1139   WBC AUTO x10*3/uL 6.4   HEMOGLOBIN g/dL 10.7*   HEMATOCRIT % 34.8*   PLATELETS AUTO x10*3/uL 316         Imaging  CT chest abdomen pelvis wo IV contrast  Result Date: 5/19/2025  Chest 1.  Bilateral layering effusions with adjacent atelectasis and potential pneumonia. 2. Cardiomegaly. 3. Scattered arterial vascular calcifications. 4. Potential multinodular thyroid gland although poorly assessed.   Abdomen-Pelvis 1.  No acute significant traumatic abnormality. 2. Multifocal probably benign renal hypodensities favoring cysts although some are too small to characterize.     MACRO: Incidental Finding:  A small focal renal lesion seen, too small to further characterize, but given the homogeneous attenuation likely represent benign etiology/simple cyst.  (**-YCF-**)   Instructions:  No further workup is needed. (Heremily BR, Christi SG, Tito NM, et al. Management of the Incidental Renal Mass on CT: A White Paper of the ACR Incidental Findings Committee. J Am Aryan Radiol. 2018;15(2):264-273.) [email or web address]   Signed by: Noel Johns 5/19/2025 12:28 PM Dictation workstation:   [id number]    CT head W O contrast trauma protocol  Result Date: 5/19/2025  Head: No acute intracranial abnormality or calvarial fracture was identified.   Atrophy and nonspecific low-density white matter changes.   Mild mucosal thickening or secretions paranasal sinuses.   Cervical spine: About 2 mm C4, C6 and C7 anterolisthesis.   No acute fracture.   No acute paravertebral hematoma.   Multilevel degenerative changes.   Scattered arterial vascular calcifications.   MACRO: None   Signed by: Noel Johns 5/19/2025 12:17 PM Dictation workstation:    [id number]    CT cervical spine wo IV contrast  Result Date: 5/19/2025  Head: No acute intracranial abnormality or calvarial fracture was identified.   Atrophy and nonspecific low-density white matter changes.   Mild mucosal thickening or secretions paranasal sinuses.   Cervical spine: About 2 mm C4, C6 and C7 anterolisthesis.   No acute fracture.   No acute paravertebral hematoma.   Multilevel degenerative changes.   Scattered arterial vascular calcifications.   MACRO: None   Signed by: Noel Johns 5/19/2025 12:17 PM Dictation workstation:   [id number]    XR chest 1 view  Result Date: 5/16/2025  1. Slightly improved layering left-greater-than-right pleural effusion with surrounding atelectasis.   Signed by: Vincent Vega 5/16/2025 7:53 AM Dictation workstation:   [id number]    XR chest 1 view  Result Date: 5/14/2025  Patchy bilateral perihilar and basilar infiltrates and probable small effusions.   MACRO: none   Signed by: Jan Michaels 5/14/2025 10:29 AM Dictation workstation:   [id number]      Cardiology, Vascular, and Other Imaging  ECG 12 lead  Result Date: 5/16/2025  Sinus rhythm Atrial premature complex Borderline right axis deviation Probable LVH with secondary repol abnrm Anterior Q waves, possibly due to LVH    Transthoracic Echo Complete  Result Date: 5/14/2025   [facility], [address] [place] [address]           Tel [phone] and Fax [phone] TRANSTHORACIC ECHOCARDIOGRAM REPORT  Patient Name:       OLIVIA EAST          Mateo Physician:    [id number] Michael Fung MD Study Date:         5/14/2025           Ordering Provider:    [id number] BERNAL MRN/PID:            [id number]            Fellow: Accession#:         [id number]        Nurse: Date of Birth/Age:  12/26/1924 / 100    Sonographer:          Teresa faustin                                      RDCS Gender assigned at  F                   Additional Staff: Birth: Height:                                 Admit Date:           5/14/2025 Weight:             45.36 kg            Admission Status:     Inpatient -                                                               Routine BSA / BMI:          1.27 m2 / kg/m2     Encounter#:           [id number] Blood Pressure:     258/105 mmHg        Department Location:  [facility] Study Type:    TRANSTHORACIC ECHO (TTE) COMPLETE Diagnosis/ICD: Unspecified systolic (congestive) heart failure (CHF)-I50.20 Indication:    SOB CPT Code:      Echo Complete w Full Doppler-[id number] Patient History: Pertinent History: HTN. Study Detail: The following Echo studies were performed: 2D, M-Mode, Doppler and               color flow. Technically challenging study due to patient lying in               supine position.  PHYSICIAN INTERPRETATION: Left Ventricle: Left ventricular ejection fraction is normal, by visual estimate at 65-70%. There is severely increased concentric left ventricular hypertrophy. There are no regional left ventricular wall motion abnormalities. The left ventricular cavity size is normal. Left ventricular diastolic filling is indeterminate. Left Atrium: The left atrial size is moderately dilated. Right Ventricle: The right ventricle is normal in size. There is normal right ventricular global systolic function. Right Atrium: The right atrium is normal in size. Aortic Valve: The aortic valve is trileaflet. There is mild aortic valve cusp calcification. There is trace aortic valve regurgitation. The peak instantaneous gradient of the aortic valve is 14 mmHg. Mitral Valve: The mitral valve is normal in structure. There is mild mitral annular calcification. There is moderate mitral valve regurgitation. Tricuspid Valve: The tricuspid valve is structurally normal. There is moderate tricuspid regurgitation. The Doppler estimated RVSP is moderate  to severely elevated at 61.4 mmHg. Pulmonic Valve: The pulmonic valve is not well visualized. The pulmonic valve regurgitation was not well visualized. Pericardium: There is no pericardial effusion noted. Aorta: The aortic root is normal.  CONCLUSIONS:  1. There is severely increased concentric left ventricular hypertrophy.  2. Left ventricular ejection fraction is normal, by visual estimate at 65-70%.  3. Moderate mitral valve regurgitation.  4. Moderate tricuspid regurgitation visualized.  5. Moderate to severely elevated right ventricular systolic pressure. QUANTITATIVE DATA SUMMARY:  2D MEASUREMENTS:          Normal Ranges: Ao Root d:       2.80 cm  (2.0-3.7cm) LAs:             4.50 cm  (2.7-4.0cm) IVSd:            1.92 cm  (0.6-1.1cm) LVPWd:           1.88 cm  (0.6-1.1cm) LVIDd:           3.58 cm  (3.9-5.9cm) LVIDs:           2.57 cm LV Mass Index:   233 g/m2 LVEDV Index:     29 ml/m2 LV % FS          28.2 %  LEFT ATRIUM:                  Normal Ranges: LA Vol A4C:        88.4 ml    (22+/-6mL/m2) LA Vol A2C:        89.3 ml LA Vol BP:         91.7 ml LA Vol Index A4C:  69.5ml/m2 LA Vol Index A2C:  70.2 ml/m2 LA Vol Index BP:   72.1 ml/m2 LA Area A4C:       25.8 cm2 LA Area A2C:       25.1 cm2 LA Major Axis A4C: 6.4 cm LA Major Axis A2C: 6.0 cm LA Vol A4C:        86.1 ml LA Vol A2C:        80.8 ml LA Vol Index BSA:  65.6 ml/m2  RIGHT ATRIUM:          Normal Ranges: RA Area A4C:  12.3 cm2  M-MODE MEASUREMENTS:         Normal Ranges: Ao Root:             3.00 cm (2.0-3.7cm) LAs:                 5.10 cm (2.7-4.0cm)  AORTA MEASUREMENTS:         Normal Ranges: Asc Ao, d:          2.90 cm (2.1-3.4cm)  LV SYSTOLIC FUNCTION:                      Normal Ranges: EF-A4C View:    74 % (>=55%) EF-A2C View:    82 % EF-Biplane:     81 % EF-Visual:      68 % LV EF Reported: 68 %  LV DIASTOLIC FUNCTION:           Normal Ranges: MV Peak A:             0.61 m/s  (0.42-0.7 m/s) MV e'                  0.061 m/s (>8.0) MV lateral e'           0.06 m/s MV medial e'           0.06 m/s  MITRAL VALVE:          Normal Ranges: MV DT:        156 msec (150-240msec)  MITRAL INSUFFICIENCY:             Normal Ranges: MR Vmax:              599.50 cm/s  AORTIC VALVE:           Normal Ranges: AoV Vmax:     1.90 m/s  (<=1.7m/s) AoV Peak P.4 mmHg (<20mmHg) LVOT Max Delta: 0.92 m/s  (<=1.1m/s) LVOT VTI:     15.50 cm  RIGHT VENTRICLE: RV Basal 3.06 cm RV Mid   1.89 cm RV Major 5.4 cm TAPSE:   19.1 mm RV s'    0.14 m/s  TRICUSPID VALVE/RVSP:          Normal Ranges: Peak TR Velocity:     3.82 m/s RV Syst Pressure:     61 mmHg  (< 30mmHg)  [id number] Michael Fung MD Electronically signed on 2025 at 2:56:22 PM  ** Final **         Assessment  Nae Samayoa  y.o. female  on day  0 of admission presenting with Aspiration pneumonia (Multi). Actively being treated for the  following medical Problems:    Acute hypoxic respiratory failure  Bilateral pulmonary infiltrate groundglass pattern predominantly at the lower bases posterior segment consistent with most likely aspiration  Bilateral pleural effusion  Newly diagnosed heart failure  Choking episodes with possible Heimlich maneuver/negative pressure pulmonary edema give patient 20 mg of IV Lasix      Recommendation:   Patient was started on Zosyn and azithromycin  Swallow evaluation  Bronchodilator therapy  Aggressive bronchopulmonary hygiene with Mucomyst/EZ Pap  Bedside swallow evaluation was done by patient RN and patient cleared oxygen for saturation of 89 to 94%  Incentive spirometry  Bronchodilators therapy as needed  PT/OT  DVT prophylaxis  Minimize benzodiazepine and narcotics  Encourage ambulation  Head evaluation and aspiration precautions.          Tere Hill MD  Pulmonary critical care consultant  25  4:56 PM       STAFF PHYSICIAN NOTE OF PERSONAL INVOLVEMENT IN CARE  As the attending physician, I certify that I personally reviewed the patient's history and personally examined the patient to  confirm the physical findings described above, and that I reviewed the relevant imaging studies and available reports.  I also discussed the differential diagnosis and all of the proposed management plans with the patient and individuals accompanying the patient to this visit.  They had the opportunity to ask questions about the proposed management plans and to have those questions answered.           [1] amLODIPine, 10 mg, oral, Daily  azithromycin, 500 mg, intravenous, q24h  [START ON 5/20/2025] furosemide, 10 mg, oral, Daily  insulin lispro, 0-5 Units, subcutaneous, TID AC  ipratropium-albuteroL, 3 mL, nebulization, TID  [START ON 5/20/2025] losartan, 100 mg, oral, Daily  metoprolol tartrate, 100 mg, oral, BID  oxygen, , inhalation, Continuous - Inhalation  piperacillin-tazobactam, 2.25 g, intravenous, q6h  psyllium, 1 packet, oral, Daily  [2]    [3] PRN medications: acetaminophen **OR** acetaminophen **OR** acetaminophen, dextrose, dextrose, glucagon, glucagon, ipratropium-albuteroL

## 2025-05-19 NOTE — ED PROVIDER NOTES
HPI   Chief Complaint   Patient presents with    Altered Mental Status    Shortness of Breath       HPI: 100-year-old female DNR presents from home for what EMS stated was a syncopal episode and fell out of a chair striking her head.  Patient was initially unable to give me any further history.  Son is currently at the bedside.  He states that he fed her a bagel and he states that he thinks that she choked on it.  He states that he did try to give her the Heimlich maneuver.  He states that he also tried to give her mouth-to-mouth.  When EMS arrived the patient was decreased responsiveness.  She was also hypoxic.    Family HX: Denies any significant/pertinent family history.  Social Hx: Denies ETOH or drug use.  Review of systems:  Gen.: No weight loss, fatigue, anorexia, insomnia, fever.   Eyes: No vision loss, double vision, drainage, eye pain.   ENT: No pharyngitis, dry mouth.   Cardiac: No chest pain, palpitations, syncope, near syncope.   Pulmonary: No shortness of breath, cough, hemoptysis.   Heme/lymph: No swollen glands, fever, bleeding.   GI: No abdominal pain, change in bowel habits, melena, hematemesis, hematochezia, nausea, vomiting, diarrhea.   : No discharge, dysuria, frequency, urgency, hematuria.   Musculoskeletal: No limb pain, joint pain, joint swelling.   Skin: No rashes.   Psych: No depression, anxiety, suicidality, homicidality.   Review of systems is otherwise negative unless stated above or in history of present illness.    Physical Exam:    Appearance: Patient will follow some commands.  Patient does not answer any questions.    Skin: Intact,  dry skin, no lesions, rash, petechiae or purpura.     Eyes: PERRLA, EOMs intact,  Conjunctiva pink with no redness or exudates. Eyelids without lesions. No scleral icterus.     ENT: Hearing grossly intact. External auditory canals patent, tympanic membranes intact with visible landmarks. Nares patent, mucus membranes moist. Dentition without lesions.  Pharynx clear, uvula midline.     Neck: Supple, without meningismus. Thyroid not palpable. Trachea at midline. No lymphadenopathy.    Pulmonary: Diminished bilaterally no accessory muscle use or stridor.    Cardiac: Normal S1, S2 without murmur, rub, gallop or extrasystole. No JVD, Carotids without bruits.    Abdomen: Soft, nontender, active bowel sounds.  No palpable organomegaly.  No rebound or guarding.  No CVA tenderness.    Genitourinary: Exam deferred.    Musculoskeletal: Full range of motion. no pain, edema, or deformity. Pulses full and equal. No cyanosis, clubbing, or edema.    Neurological: Patient opened eyes on command but did not follow any other commands.  Unable to do initial neuroexam as patient was not doing any spontaneous movement  Psychiatric: Appropriate mood and affect.     Medical Decision-Making:  Testing: Given that the patient had what was supposed to be a syncopal episode and then with head injury I did do a CT of her head.  It did not show any acute intracranial hemorrhage or mass effect.  She also underwent a CT of the C-spine as well as a CT chest abdomen pelvis.  There was concern for pneumonia.  Given the patient's history I was concerned that she may have aspiration pneumonia.  Therefore she was placed on oxygen and is currently on Airvo.  She is a DNR.  There is a signed paper.  On reevaluation patient is markedly more alert.  She was able to tell me that she did choke on a bagel earlier today.  She is currently moving her extremities and following commands.  She will be admitted as I am concerned she may have aspirated.  Treatment:   Reevaluation:   Plan: AdmitPatient and family/friend/caregiver are in agreement with this plan.   Impression:   1. aspiration  2.    Labs Reviewed  CBC WITH AUTO DIFFERENTIAL - Abnormal     WBC                           6.4                    nRBC                          0.0                    RBC                           3.56 (*)                Hemoglobin                    10.7 (*)               Hematocrit                    34.8 (*)               MCV                           98                     MCH                           30.1                   MCHC                          30.7 (*)               RDW                           14.6 (*)               Platelets                     316                    Neutrophils %                 45.0                   Immature Granulocytes %, Automated   0.3                    Lymphocytes %                 44.6                   Monocytes %                   8.1                    Eosinophils %                 1.1                    Basophils %                   0.9                    Neutrophils Absolute          2.87                   Immature Granulocytes Absolute, Au*   0.02                   Lymphocytes Absolute          2.85                   Monocytes Absolute            0.52                   Eosinophils Absolute          0.07                   Basophils Absolute            0.06                COMPREHENSIVE METABOLIC PANEL - Abnormal     Glucose                       257 (*)                Sodium                        141                    Potassium                     3.9                    Chloride                      106                    Bicarbonate                   25                     Anion Gap                     14                     Urea Nitrogen                 28 (*)                 Creatinine                    1.40 (*)               eGFR                          34 (*)                 Calcium                       8.1 (*)                Albumin                       3.2 (*)                Alkaline Phosphatase          98                     Total Protein                 6.5                    AST                           84 (*)                 Bilirubin, Total              0.7                    ALT                           34                  URINALYSIS WITH REFLEX MICROSCOPIC -  Abnormal     Color, Urine                  Colorless (*)               Appearance, Urine             Clear                  Specific Gravity, Urine       1.009                  pH, Urine                     7.0                    Protein, Urine                100 (2+) (*)               Glucose, Urine                100 (1+) (*)               Blood, Urine                    (*)                  Ketones, Urine                NEGATIVE                Bilirubin, Urine              NEGATIVE                Urobilinogen, Urine           Normal                 Nitrite, Urine                NEGATIVE                Leukocyte Esterase, Urine     NEGATIVE             MICROSCOPIC ONLY, URINE - Abnormal     WBC, Urine                    1-5                    RBC, Urine                    1-2                    Bacteria, Urine               1+ (*)                 Mucus, Urine                  FEW                 POCT GLUCOSE - Abnormal     POCT Glucose                  257 (*)             ALCOHOL - Normal     Alcohol                       <10                 LACTATE - Normal     Lactate                       2.0                      Narrative: Venipuncture immediately after or during the administration of Metamizole may lead to falsely low results. Testing should be performed immediately prior to Metamizole dosing.  PROTIME-INR - Normal     Protime                       11.5                   INR                           1.0                 BLOOD CULTURE  BLOOD CULTURE     CT head W O contrast trauma protocol   Final Result    Head: No acute intracranial abnormality or calvarial fracture was    identified.          Atrophy and nonspecific low-density white matter changes.          Mild mucosal thickening or secretions paranasal sinuses.          Cervical spine: About 2 mm C4, C6 and C7 anterolisthesis.          No acute fracture.          No acute paravertebral hematoma.          Multilevel degenerative changes.           Scattered arterial vascular calcifications.          MACRO:    None          Signed by: Noel Johns 5/19/2025 12:17 PM    Dictation workstation:   [id number]     CT cervical spine wo IV contrast   Final Result    Head: No acute intracranial abnormality or calvarial fracture was    identified.          Atrophy and nonspecific low-density white matter changes.          Mild mucosal thickening or secretions paranasal sinuses.          Cervical spine: About 2 mm C4, C6 and C7 anterolisthesis.          No acute fracture.          No acute paravertebral hematoma.          Multilevel degenerative changes.          Scattered arterial vascular calcifications.          MACRO:    None          Signed by: Noel Johns 5/19/2025 12:17 PM    Dictation workstation:   [id number]     CT chest abdomen pelvis wo IV contrast   Final Result    Chest    1.  Bilateral layering effusions with adjacent atelectasis and    potential pneumonia.    2. Cardiomegaly.    3. Scattered arterial vascular calcifications.    4. Potential multinodular thyroid gland although poorly assessed.          Abdomen-Pelvis    1.  No acute significant traumatic abnormality.    2. Multifocal probably benign renal hypodensities favoring cysts    although some are too small to characterize.                MACRO:    Incidental Finding:  A small focal renal lesion seen, too small to    further characterize, but given the homogeneous attenuation likely    represent benign etiology/simple cyst.  (**-YCF-**)          Instructions:  No further workup is needed.    (Marielena BR, Christi SG, Tito NM, et al. Management of the    Incidental Renal Mass on CT: A White Paper of the ACR Incidental    Findings Committee. J Am Aryan Radiol. 2018;15(2):264-273.)    [email or web address]          Signed by: Noel Johns 5/19/2025 12:28 PM    Dictation workstation:   [id number]                    Patient History   Medical History[1]  Surgical History[2]  Family  History[3]  Social History[4]    Physical Exam   ED Triage Vitals   Temperature Heart Rate Respirations BP   05/19/25 1115 05/19/25 1115 05/19/25 1115 05/19/25 1115   35.8 °C (96.4 °F) 71 18 (!) 216/96      Pulse Ox Temp Source Heart Rate Source Patient Position   05/19/25 1115 05/19/25 1115 -- --   (!) 91 % Temporal        BP Location FiO2 (%)     -- 05/19/25 1121      70 %       Physical Exam      ED Course & MDM   Diagnoses as of 05/19/25 1516   Aspiration into airway, sequela                 No data recorded     [place] Coma Scale Score: 9 (05/19/25 1204 : Morgan Guardado RN)                           Medical Decision Making      Procedure  Procedures       Elen San MD  05/19/25 1595       [1] No past medical history on file.  [2] No past surgical history on file.  [3] No family history on file.  [4]   Social History  Tobacco Use    Smoking status: Never    Smokeless tobacco: Never   Substance Use Topics    Alcohol use: Not on file    Drug use: Not on file        Elen San MD  05/19/25 1310

## 2025-05-19 NOTE — CARE PLAN
The patient's goals for the shift include      The clinical goals for the shift include pt will be safe and comftorable      Problem: Pain - Adult  Goal: Verbalizes/displays adequate comfort level or baseline comfort level  Outcome: Progressing     Problem: Safety - Adult  Goal: Free from fall injury  Outcome: Progressing     Problem: Discharge Planning  Goal: Discharge to home or other facility with appropriate resources  Outcome: Progressing     Problem: Chronic Conditions and Co-morbidities  Goal: Patient's chronic conditions and co-morbidity symptoms are monitored and maintained or improved  Outcome: Progressing     Problem: Nutrition  Goal: Nutrient intake appropriate for maintaining nutritional needs  Outcome: Progressing

## 2025-05-20 ENCOUNTER — APPOINTMENT (OUTPATIENT)
Dept: CARDIOLOGY | Facility: HOSPITAL | Age: OVER 89
DRG: 177 | End: 2025-05-20
Payer: MEDICARE

## 2025-05-20 ENCOUNTER — HOME CARE VISIT (OUTPATIENT)
Dept: HOME HEALTH SERVICES | Facility: HOME HEALTH | Age: OVER 89
End: 2025-05-20

## 2025-05-20 ENCOUNTER — APPOINTMENT (OUTPATIENT)
Dept: RADIOLOGY | Facility: HOSPITAL | Age: OVER 89
DRG: 177 | End: 2025-05-20
Payer: MEDICARE

## 2025-05-20 LAB
ANION GAP SERPL CALC-SCNC: 14 MMOL/L (ref 10–20)
ATRIAL RATE: 85 BPM
BUN SERPL-MCNC: 26 MG/DL (ref 6–23)
CALCIUM SERPL-MCNC: 8.4 MG/DL (ref 8.6–10.3)
CHLORIDE SERPL-SCNC: 102 MMOL/L (ref 98–107)
CO2 SERPL-SCNC: 29 MMOL/L (ref 21–32)
CREAT SERPL-MCNC: 1.43 MG/DL (ref 0.5–1.05)
EGFRCR SERPLBLD CKD-EPI 2021: 33 ML/MIN/1.73M*2
ERYTHROCYTE [DISTWIDTH] IN BLOOD BY AUTOMATED COUNT: 14.5 % (ref 11.5–14.5)
GLUCOSE BLD MANUAL STRIP-MCNC: 164 MG/DL (ref 74–99)
GLUCOSE BLD MANUAL STRIP-MCNC: 88 MG/DL (ref 74–99)
GLUCOSE SERPL-MCNC: 99 MG/DL (ref 74–99)
HCT VFR BLD AUTO: 31.5 % (ref 36–46)
HGB BLD-MCNC: 9.5 G/DL (ref 12–16)
LEGIONELLA AG UR QL: NEGATIVE
MCH RBC QN AUTO: 29.9 PG (ref 26–34)
MCHC RBC AUTO-ENTMCNC: 30.2 G/DL (ref 32–36)
MCV RBC AUTO: 99 FL (ref 80–100)
NRBC BLD-RTO: 0 /100 WBCS (ref 0–0)
P AXIS: 90 DEGREES
PLATELET # BLD AUTO: 252 X10*3/UL (ref 150–450)
POTASSIUM SERPL-SCNC: 3.4 MMOL/L (ref 3.5–5.3)
PR INTERVAL: 145 MS
PROCALCITONIN SERPL-MCNC: 0.17 NG/ML
Q ONSET: 249 MS
QRS COUNT: 14 BEATS
QRS DURATION: 91 MS
QT INTERVAL: 396 MS
QTC CALCULATION(BAZETT): 474 MS
QTC FREDERICIA: 446 MS
R AXIS: 81 DEGREES
RBC # BLD AUTO: 3.18 X10*6/UL (ref 4–5.2)
S PNEUM AG UR QL: NEGATIVE
SODIUM SERPL-SCNC: 142 MMOL/L (ref 136–145)
T AXIS: 268 DEGREES
T OFFSET: 447 MS
VENTRICULAR RATE: 86 BPM
WBC # BLD AUTO: 9.8 X10*3/UL (ref 4.4–11.3)

## 2025-05-20 PROCEDURE — 92610 EVALUATE SWALLOWING FUNCTION: CPT | Mod: GN

## 2025-05-20 PROCEDURE — 2500000004 HC RX 250 GENERAL PHARMACY W/ HCPCS (ALT 636 FOR OP/ED): Mod: JZ | Performed by: PHYSICIAN ASSISTANT

## 2025-05-20 PROCEDURE — 2500000005 HC RX 250 GENERAL PHARMACY W/O HCPCS: Performed by: EMERGENCY MEDICINE

## 2025-05-20 PROCEDURE — 2500000004 HC RX 250 GENERAL PHARMACY W/ HCPCS (ALT 636 FOR OP/ED): Performed by: EMERGENCY MEDICINE

## 2025-05-20 PROCEDURE — 84145 PROCALCITONIN (PCT): CPT | Mod: PARLAB | Performed by: PHYSICIAN ASSISTANT

## 2025-05-20 PROCEDURE — 71046 X-RAY EXAM CHEST 2 VIEWS: CPT

## 2025-05-20 PROCEDURE — 93005 ELECTROCARDIOGRAM TRACING: CPT

## 2025-05-20 PROCEDURE — 85027 COMPLETE CBC AUTOMATED: CPT | Performed by: PHYSICIAN ASSISTANT

## 2025-05-20 PROCEDURE — 2500000001 HC RX 250 WO HCPCS SELF ADMINISTERED DRUGS (ALT 637 FOR MEDICARE OP): Performed by: PHYSICIAN ASSISTANT

## 2025-05-20 PROCEDURE — 82374 ASSAY BLOOD CARBON DIOXIDE: CPT | Performed by: PHYSICIAN ASSISTANT

## 2025-05-20 PROCEDURE — 2500000004 HC RX 250 GENERAL PHARMACY W/ HCPCS (ALT 636 FOR OP/ED): Mod: JZ | Performed by: NURSE PRACTITIONER

## 2025-05-20 PROCEDURE — 2500000001 HC RX 250 WO HCPCS SELF ADMINISTERED DRUGS (ALT 637 FOR MEDICARE OP): Performed by: INTERNAL MEDICINE

## 2025-05-20 PROCEDURE — 2500000002 HC RX 250 W HCPCS SELF ADMINISTERED DRUGS (ALT 637 FOR MEDICARE OP, ALT 636 FOR OP/ED): Performed by: PHYSICIAN ASSISTANT

## 2025-05-20 PROCEDURE — 97165 OT EVAL LOW COMPLEX 30 MIN: CPT | Mod: GO

## 2025-05-20 PROCEDURE — 80048 BASIC METABOLIC PNL TOTAL CA: CPT | Performed by: PHYSICIAN ASSISTANT

## 2025-05-20 PROCEDURE — 94640 AIRWAY INHALATION TREATMENT: CPT

## 2025-05-20 PROCEDURE — 97161 PT EVAL LOW COMPLEX 20 MIN: CPT | Mod: GP

## 2025-05-20 PROCEDURE — 36415 COLL VENOUS BLD VENIPUNCTURE: CPT | Performed by: PHYSICIAN ASSISTANT

## 2025-05-20 PROCEDURE — 1100000001 HC PRIVATE ROOM DAILY

## 2025-05-20 PROCEDURE — 82947 ASSAY GLUCOSE BLOOD QUANT: CPT

## 2025-05-20 PROCEDURE — 71046 X-RAY EXAM CHEST 2 VIEWS: CPT | Performed by: RADIOLOGY

## 2025-05-20 PROCEDURE — 2500000002 HC RX 250 W HCPCS SELF ADMINISTERED DRUGS (ALT 637 FOR MEDICARE OP, ALT 636 FOR OP/ED): Performed by: INTERNAL MEDICINE

## 2025-05-20 PROCEDURE — 2500000002 HC RX 250 W HCPCS SELF ADMINISTERED DRUGS (ALT 637 FOR MEDICARE OP, ALT 636 FOR OP/ED): Mod: JZ | Performed by: INTERNAL MEDICINE

## 2025-05-20 RX ORDER — HALOPERIDOL LACTATE 5 MG/ML
5 INJECTION, SOLUTION INTRAMUSCULAR ONCE AS NEEDED
Status: DISCONTINUED | OUTPATIENT
Start: 2025-05-20 | End: 2025-05-21 | Stop reason: HOSPADM

## 2025-05-20 RX ORDER — AMOXICILLIN AND CLAVULANATE POTASSIUM 500; 125 MG/1; MG/1
1 TABLET, FILM COATED ORAL EVERY 12 HOURS SCHEDULED
Status: DISCONTINUED | OUTPATIENT
Start: 2025-05-20 | End: 2025-05-21 | Stop reason: HOSPADM

## 2025-05-20 RX ORDER — HALOPERIDOL LACTATE 5 MG/ML
5 INJECTION, SOLUTION INTRAMUSCULAR ONCE
Status: COMPLETED | OUTPATIENT
Start: 2025-05-20 | End: 2025-05-20

## 2025-05-20 RX ADMIN — PIPERACILLIN SODIUM AND TAZOBACTAM SODIUM 2.25 G: 2; .25 INJECTION, SOLUTION INTRAVENOUS at 11:24

## 2025-05-20 RX ADMIN — AMLODIPINE BESYLATE 10 MG: 10 TABLET ORAL at 10:02

## 2025-05-20 RX ADMIN — METOPROLOL TARTRATE 100 MG: 100 TABLET, FILM COATED ORAL at 21:28

## 2025-05-20 RX ADMIN — LOSARTAN POTASSIUM 100 MG: 50 TABLET, FILM COATED ORAL at 10:02

## 2025-05-20 RX ADMIN — IPRATROPIUM BROMIDE AND ALBUTEROL SULFATE 3 ML: .5; 3 SOLUTION RESPIRATORY (INHALATION) at 19:42

## 2025-05-20 RX ADMIN — ACETYLCYSTEINE 6 ML: 100 SOLUTION ORAL; RESPIRATORY (INHALATION) at 19:43

## 2025-05-20 RX ADMIN — AMOXICILLIN AND CLAVULANATE POTASSIUM 1 TABLET: 500; 125 TABLET, FILM COATED ORAL at 21:29

## 2025-05-20 RX ADMIN — PIPERACILLIN SODIUM AND TAZOBACTAM SODIUM 2.25 G: 2; .25 INJECTION, SOLUTION INTRAVENOUS at 17:34

## 2025-05-20 RX ADMIN — Medication 21 PERCENT: at 19:43

## 2025-05-20 RX ADMIN — Medication 1 L/MIN: at 10:03

## 2025-05-20 RX ADMIN — FUROSEMIDE 10 MG: 20 TABLET ORAL at 10:02

## 2025-05-20 RX ADMIN — INSULIN LISPRO 1 UNITS: 100 INJECTION, SOLUTION INTRAVENOUS; SUBCUTANEOUS at 17:34

## 2025-05-20 RX ADMIN — METOPROLOL TARTRATE 100 MG: 100 TABLET, FILM COATED ORAL at 10:02

## 2025-05-20 RX ADMIN — PSYLLIUM HUSK 1 PACKET: 3.4 POWDER ORAL at 10:02

## 2025-05-20 RX ADMIN — HALOPERIDOL LACTATE 5 MG: 5 INJECTION, SOLUTION INTRAMUSCULAR at 03:30

## 2025-05-20 RX ADMIN — AZITHROMYCIN DIHYDRATE 500 MG: 250 TABLET ORAL at 17:34

## 2025-05-20 ASSESSMENT — COGNITIVE AND FUNCTIONAL STATUS - GENERAL
DAILY ACTIVITIY SCORE: 18
MOVING TO AND FROM BED TO CHAIR: A LOT
CLIMB 3 TO 5 STEPS WITH RAILING: A LOT
MOBILITY SCORE: 14
STANDING UP FROM CHAIR USING ARMS: A LOT
TOILETING: A LOT
PERSONAL GROOMING: A LITTLE
WALKING IN HOSPITAL ROOM: A LOT
EATING MEALS: A LITTLE
DRESSING REGULAR UPPER BODY CLOTHING: A LOT
MOBILITY SCORE: 12
HELP NEEDED FOR BATHING: A LOT
WALKING IN HOSPITAL ROOM: A LOT
MOVING FROM LYING ON BACK TO SITTING ON SIDE OF FLAT BED WITH BEDRAILS: A LITTLE
TURNING FROM BACK TO SIDE WHILE IN FLAT BAD: A LOT
TOILETING: A LITTLE
DRESSING REGULAR LOWER BODY CLOTHING: A LOT
DRESSING REGULAR LOWER BODY CLOTHING: A LITTLE
TURNING FROM BACK TO SIDE WHILE IN FLAT BAD: A LITTLE
STANDING UP FROM CHAIR USING ARMS: A LOT
DAILY ACTIVITIY SCORE: 14
DRESSING REGULAR UPPER BODY CLOTHING: A LITTLE
HELP NEEDED FOR BATHING: A LITTLE
CLIMB 3 TO 5 STEPS WITH RAILING: TOTAL
MOVING TO AND FROM BED TO CHAIR: A LOT
PERSONAL GROOMING: A LITTLE
MOVING FROM LYING ON BACK TO SITTING ON SIDE OF FLAT BED WITH BEDRAILS: A LITTLE
EATING MEALS: A LITTLE

## 2025-05-20 ASSESSMENT — PAIN SCALES - GENERAL
PAINLEVEL_OUTOF10: 0 - NO PAIN

## 2025-05-20 ASSESSMENT — PAIN - FUNCTIONAL ASSESSMENT
PAIN_FUNCTIONAL_ASSESSMENT: 0-10
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
## 2025-05-20 NOTE — PROGRESS NOTES
Physical Therapy    Physical Therapy Evaluation    Patient Name: Nae Samayoa  MRN: [id number]  Today's Date: 5/20/2025   Time Calculation  Start Time: 0912  Stop Time: 0923  Time Calculation (min): 11 min  718/718-A    Assessment/Plan   PT Assessment  PT Assessment Results: Decreased strength, Decreased endurance, Impaired balance, Decreased mobility, Impaired judgement, Decreased safety awareness, Decreased cognition  Rehab Prognosis: Good  Barriers to Discharge Home: Caregiver assistance, Physical needs  Caregiver Assistance: Caregiver assistance needed per identified barriers - however, level of patient's required assistance exceeds assistance available at home  Physical Needs: Ambulating household distances limited by function/safety  Evaluation/Treatment Tolerance: Patient limited by fatigue  End of Session Communication: Bedside nurse  Assessment Comment: Continued skilled PT intervention indicated to facilitate increased strength, balance & gait stability  End of Session Patient Position: On cart (w/ transporter present)  IP OR SWING BED PT PLAN  Inpatient or Swing Bed: Inpatient  PT Plan  Treatment/Interventions: Bed mobility, Transfer training, Gait training, Balance training, Therapeutic exercise, Therapeutic activity  PT Plan: Ongoing PT  PT Frequency: 3 times per week  PT Discharge Recommendations: Moderate intensity level of continued care  PT Recommended Transfer Status: Assist x2  PT - OK to Discharge: Yes (to next level of care when cleared by medical team)    Subjective     Current Problem:  1. Aspiration into airway, sequela          Problem List[1]  past medical history significant for HTN, new onset CHF, and CKD   General Visit Information:  General  Reason for Referral: PT eval & treat/impaired mobility DX: aspiration pneumonia, hypoxia, encephalopathy; 5/19/25 syncopal episode, eating a bagel became unresponsive  Referred By: Esvin  Caregiver Feedback: Per conference w/ RN patient stable to  participate in therapy  Co-Treatment: OT  Co-Treatment Reason: to maximize pt safety& mobility  Patient Position Received: Bed, 2 rail up, Alarm on  General Comment: Pleasant & cooperative, confused but receptive to mobility& instructions; unsteady gait, fall risk    Home Living:  Home Living  Home Living Comments: lives w/ son in 1st fl apartment; tub shower w/ hand held shower hose; standard ht toilet    Prior Level of Function:  Prior Function Per Pt/Caregiver Report  Prior Function Comments: independent mobility w/o device not h/o falls; independent adl's    Precautions:  Precautions  Precautions Comment: fall, alarm  Pain:  Pain Assessment  Pain Assessment: 0-10  0-10 (Numeric) Pain Score: 0 - No pain    Cognition:  Cognition  Overall Cognitive Status:  (oriented to self)    General Assessments:      Activity Tolerance  Endurance: Decreased tolerance for upright activites  Sensation  Light Touch: No apparent deficits     Functional Assessments:     Bed Mobility  Bed Mobility:  (mod assist x2 supine>sit from bed, mod assist x2 sit>supine onto transport cart)  Transfers  Transfer:  (min assist x2 sit >stand from bed, retropulsive upon standing w/ difficulty extending trunk &knees' min assist x1 stand>sit cues for safe positioning & eccentric control)  Ambulation/Gait Training  Ambulation/Gait Training Performed:  (min assist x2 hand held assist sidestepping lt toward hob; 10ft bed>cart for transport, unsteady w/ short shuffling steps, retropulsive, fall risk)   Extremity/Trunk Assessments:        RLE   RLE :  (arom grossly wfl, strength grossly at least 3+/5 w/ decreased follow of instructions for formal mmt)  LLE   LLE :  (arom grossly wfl, strength grossly at least 3+/5 w/ decreased follow of instructions for formal mmt)    Outcome Measures:     [facility] Basic Mobility  Turning from your back to your side while in a flat bed without using bedrails: A little  Moving from lying on your back to sitting on the side of  a flat bed without using bedrails: A lot  Moving to and from bed to chair (including a wheelchair): A lot  Standing up from a chair using your arms (e.g. wheelchair or bedside chair): A lot  To walk in hospital room: A lot  Climbing 3-5 steps with railing: Total  Basic Mobility - Total Score: 12     Goals:  Encounter Problems       Encounter Problems (Active)       PT Problem       STG - Pt will transition supine <> sitting with supervision  (Progressing)       Start:  05/20/25    Expected End:  06/03/25            STG - Pt will transfer STS with sba  (Progressing)       Start:  05/20/25    Expected End:  06/03/25            STG - Pt will amb >=40' x9anliq ww with sba  (Progressing)       Start:  05/20/25    Expected End:  06/03/25            STG - Pt will perform 2-3 sets of BLE therex x10 to maximize functional strength and independence  (Progressing)       Start:  05/20/25    Expected End:  06/03/25            STG - Pt will maintain standing supported static balance >=3minutes with supervision  (Progressing)       Start:  05/20/25    Expected End:  06/03/25               Pain - Adult            Education Documentation  Mobility Training, taught by Kelly Velasco PT at 5/20/2025 12:32 PM.  Learner: Patient  Readiness: Acceptance  Method: Explanation  Response: Needs Reinforcement  Comment: safety, activity progression              [1]   Patient Active Problem List  Diagnosis    Acute congestive heart failure, unspecified heart failure type    Aspiration pneumonia (Multi)    Hypoxia    HTN (hypertension)    CHF (congestive heart failure)    Hyperglycemia    Anemia    Encephalopathy

## 2025-05-20 NOTE — PROGRESS NOTES
Speech-Language Pathology                 Therapy Communication Note    Patient Name: Nae Samayoa  MRN: [id number]  Department: [facility] 7  Room: [facility]  Today's Date: 5/20/2025     Discipline: Speech Language Pathology    Missed Visit:   Clinical Swallowing exam attempted but pt p/w poor CRISTY ( level of arousal ) this morning d/t pt given Haldol .     Missed Visit Reason:      Missed Time: Cancel    Comment: deferred by TAMMY Pope

## 2025-05-20 NOTE — PROGRESS NOTES
05/20/25 1231   Discharge Planning   Living Arrangements Children   Support Systems Children   Assistance Needed ADL's   Type of Residence Private residence   Number of Stairs to Enter Residence 3   Number of Stairs Within Residence   (Basement)   Do you have animals or pets at home? No   Who is requesting discharge planning? Provider   Home or Post Acute Services In home services   Type of Home Care Services Home health aide;Home PT;Home OT   Expected Discharge Disposition Home H   Does the patient need discharge transport arranged? No   Intensity of Service   Intensity of Service 0-30 min     Met with son at bedside. Introduced self and role as care coordinator. Demographics verified. Patient PCP is Esvin. Patient insurance is medicare and [employer]. Patient is independent with mobility. Patient needs assistance getting in and out of the shower. Patient would benefit from [facility]. Son was getting set up before she came back to the hospital. PT/OT pending. Care coordinator will follow for discharge planning.

## 2025-05-20 NOTE — PROGRESS NOTES
Speech-Language Pathology    SLP Adult Inpatient Speech-Language Pathology Clinical Swallow Evaluation    Patient Name: Nae Samayoa  MRN: [id number]  Today's Date: 5/20/2025   Time Calculation  Start Time: 1255  Stop Time: 1317  Time Calculation (min): 22 min     Patient to ER via EMS for unresponsiveness. Per EMS, pt was eating breakfast on 5/19/25 when she fell backwards and became unresponsive.     Current Problem: Choking on a bagel piece not properly chewed  at home per the pt's son Dann who the pt lives with.   1. Aspiration into airway, sequela              Recommendations:  Additional Recommendations: Nutrition for oral supplements.   Solid Diet Recommendations : Soft & bite sized/chopped (IDDSI Level 6) Moisten all PO.   Liquid Diet Recommendations: Thin (IDDSI Level 0)  Compensatory Swallowing Strategies: Remain upright for 20-30 minutes after meals, Upright 90 degrees as possible for all oral intake, Decrease distractions during eating/feeding, Single sips, Small bites/sips, Eat/feed slowly, Add moisture to solids, Alternate solids and liquids  Medication Administration Recommendations: Whole, With Pureed      ASSESSMENT  Impressions:  Mild to Moderate Oral  reduced oral bolus prep  phase ( full incomplete inefficient mastication  skills given the pt's edentulous state and prolonged mashing on harder solids ) and  suspected functional pharyngeal phase dysphagia based on clinical swallow evaluation.   Pt would benefit from skilled ST for family training to minimize aspiration risk in the home  and ensure ongoing safety with the least restrictive dental appropriate diet.     Prognosis: Good    PLAN Compensatory swallow strategies:  - Upright positioning for all PO intake  - Small bites  - Small sips  - Alternate bites and sips  - Chew thoroughly  - Supervision recommended during meals    Recommended frequency/duration:  Skilled SLP services recommended: Yes  Frequency: 2x/week  Duration: 1 week  Discharge  recommendation: Home with no further SLP  Treatment/Interventions: Diet recommendations, Assess diet tolerance, Bolus trials, Patient/family education (son Dann)  Strengths: Family/caregiver support  Barriers to participation in tx: Cognition and Age    Goals (start date 5/20/2025):  - Patient will tolerate current diet without noted pulmonary compromise or evidence of pulmonary sequela as noted in patient chart and/or reported by patient/family.  - Pt's caregiver her son Dann  will independently implement safe swallow strategies to aide in the pt's swallowing skills as measured by SLP ongoing interventions in  95% of therapeutic trials with SLP. Initiated 5/20/25   - Pt will consume prescribed diet (current diet is Soft Bite sized solids/thins ) without overt s/sx aspiration/penetration in 95% of observed trials. Initiated 5/20/25   Status: Goal initiated   Progress this date: ONGOING       SUBJECTIVE    PMHx relevant to rehab: 5/19/25 syncopal episode, eating a bagel became unresponsive.  (? Aspiration pneumonia, hypoxia, encephalopathy)   PMH: CHF, CKD,   Chief complaint: Pt was admitted on 5/19/25  due to choking on a bagel  with a subsequent syncopal episode and a fall backwards  in her chair.   Chief Complaint   Patient presents with    Altered Mental Status    Shortness of Breath   . She was found to have suspected Aspiration pneumonia (Multi).    Relevant imaging results:    XR CHEST 2 VIEWS; 5/20/2025   IMPRESSION:  1.  Improvement of left lower lung airspace disease. Persistent small  effusions probably with posterior basilar atelectasis.      CT CERVICAL SPINE WO IV CONTRAST; CT HEAD W/O CONTRAST TRAUMA  PROTOCOL;  5/19/2025       IMPRESSION:  Head: No acute intracranial abnormality or calvarial fracture was  identified.      Atrophy and nonspecific low-density white matter changes.      Mild mucosal thickening or secretions paranasal sinuses.      Cervical spine: About 2 mm C4, C6 and C7 anterolisthesis.    "   No acute fracture.      No acute paravertebral hematoma.      Multilevel degenerative changes.      Scattered arterial vascular calcifications.        CT Chest 5/19/25:    1.  Bilateral layering effusions with adjacent atelectasis and  potential pneumonia.  2. Cardiomegaly.  3. Scattered arterial vascular calcifications.  4. Potential multinodular thyroid gland although poorly assessed.      General Visit Information:  SLP Received On: 05/20/25  Patient Class: Inpatient  Living Environment: Live with son Dann who is the pt's caretaker.      Reason for Referral: assess the current swallow       RN cleared pt to participate in session and reported pt appears to be swallowing well.     Pt's son Dann  reported that this is the only time this has happened at home. Per the son, \"she has dentures but does not like to wear them. \"    Date of Onset: 05/14/25  Date of Order: 05/19/25  [employer] Diet: Soft solids/thins  Current Diet : Clears    Status at time of evaluation:  Pain Assessment  Pain Assessment: 0-10  0-10 (Numeric) Pain Score: 0 - No pain    Pt was alert, pleasantly confused, and cooperative with encouragement for session.  Orientation: Oriented to self  Ability to follow functional commands: Requires cueing to follow simple commands  Nutritional status: Dietitian consult ordered    Respiratory status: Room air  Baseline Vocal Quality: Normal        Patient positioning: Upright in bed      OBJECTIVE  Clinical swallow evaluation completed and consisted of interview (family assisted), oral motor assessment, and PO trials of thins by straw, jello, fruit pieces and cookies.     ORAL PHASE: Pt is edentulous .   Oral mucosa were pink, moist, and free of obvious lesions. Lingual strength and ROM were functional . Labial strength/ROM were intact.    Labial seal was adequate. Mashing of regular solids was prolonged and inefficient  A/P transit and oral clearance were improved across soft more bite sized moist " solids.    PHARYNGEAL PHASE: Laryngeal elevation was visualized or palpated with all trials, however adequacy of hyolaryngeal elevation/excursion cannot be determined at bedside. No immediate or delayed s/sx aspiration/penetration were observed with any consistencies.    Was 3oz challenge administered: Attempted, however pt unable to follow commands for completion of 3oz in one trial.     Treatment/Education:  Results and recommendations were relayed to: Patient's son Dann Bedside nurse Toshia and Physician Esvin   Education provided: Yes   Learner: Son   Barriers to learning: None   Method of teaching: Verbal, Written, and Demonstration   Topic: role of ST, results of assessment, risk for aspiration, recommended diet modifications, recommended safe swallow strategies, swallow anatomy/physiology, and recommendation for dysphagia follow-up   Outcome of teaching: Caregiver demonstrated good understanding  Treatment provided: Yes       Pt 's on educated on the oral diet menu items of Soft and Bite sized solids ,moist PO with handouts provided for safe home going better food choices . Son very aware of moist cut up food choices and the use of gravy and other moistening agents to place on cut up food as well as to ground all meats.     Swallow strategies for small bites/sips and use of cyclic ingestion reviewed and demonstrated across jello 10/10 bites with good return demonstration completed .

## 2025-05-20 NOTE — CONSULTS
Pulmonary Critical Care note    Patient Name :Nae Samayoa  Date of service : 05/20/25  MRN: [id number]  YOB: 1924      Interval History:    History of Present Illness:      100 y.o. female  on day  1 of admission presenting with Aspiration pneumonia (Multi).  Past medical history of hypertension, new onset CHF, CKD, with presented to the ED after she developed a syncope, she was being fed her breakfast by her son while eating a bagel she developed decreased unresponsiveness and possible choking, no head injury but patient lost consciousness, patient's son performed Heimlich which was unsuccessful, started mouth-to-mouth, patient arrival to ED was hypoxic but became gradually more alert, she stated that she did actually choke on her food, CODE STATUS established as DNR ml, chest CT showed bibasilar infiltrates/bilateral pleural effusion  Cardiomegaly and mild atelectatic changes  Past Medical/Surgical History:    has no past medical history on file.   has no past surgical history on file.   reports that she has never smoked. She has never used smokeless tobacco. No history on file for alcohol use and drug use.  Family History:   No relevant family history has been documented for this patient.    Allergies:     Patient has no known allergies.      Social history:   reports that she has never smoked. She has never used smokeless tobacco.      Review of Systems:   General: denies weight gain, denies loss of appetite, fever, chills, night sweats.  HEENT: denies headaches, dizziness, head trauma, visual changes, eye pain, tinnitus, nosebleeds, hoarseness or throat pain    Respiratory: denies chest pain, dyspnea, cough and hemoptysis  Cardiovascular: denies orthopnea, paroxysmal nocturnal dyspnea, leg swelling, and previous heart attack.    Gastrointestinal: denies pain, nausea vomiting, diarrhea, constipation, melena or bleeding.  Genitourinary: denies hematuria, frequency, urgency or dysuria  Neurology:  denies syncope, seizures, paralysis, paraesthesia   Endocrine: denies polyuria, polydipsia, skin or hair changes, and heat or cold intolerance  Musculoskeletal: denies joint pain, swelling, arthritis or myalgia  Hematologic: denies bleeding, adenopathy and easy bruising  Skin: denies rashes and skin discoloration  Psychiatry: denies depression    Physical Exam:   Vital Signs:   Vitals:    05/20/25 1515   BP: 132/63   Pulse: 73   Resp: 18   Temp: 36.9 °C (98.4 °F)   SpO2: 94%     Vitals:    05/19/25 1850   Weight: 45.4 kg (100 lb)         Input/Output:    Intake/Output Summary (Last 24 hours) at 5/20/2025 1831  Last data filed at 5/20/2025 1734  Gross per 24 hour   Intake 3513.3 ml   Output --   Net 3513.3 ml       Oxygen requirements:    Ventilator Information:  FiO2 (%):  [21 %-50 %] 21 %  Oxygen Therapy/Pulse Ox  Medical Gas Therapy: None (Room air)  Medical Gas Delivery Method: High flow nasal cannula  FiO2 (%):  (na)  SpO2: 94 %  Patient Activity During SpO2 Measurement: At rest  Temp: 36.9 °C (98.4 °F)        General appearance: Not in pain or distress, in no respiratory distress    HEENT: Atraumatic/normocephalic, EOMI, ISABELA, pharynx clear, moist mucosa, redness of the uvula appreciated,   Neck: Supple, no jugular venous distension, lymphadenopathy, thyromegaly or carotid bruits  Chest: Patient is on Airvo, diminished bilateral breath sound with rhonchi  Cardiovascular: Normal S1, S2, regular rate and rhythm, no murmur, rub or gallop  Abdomen: Normal sounds present, soft, lax with no tenderness, no hepatosplenomegaly, and no masses  Extremities: No edema. Pulses are equally present.   Skin: intact, no rashes   Neurologic: Alert and oriented x 3, No focal deficit         Current Medications:   Scheduled medications  Scheduled Medications[1]  Continuous medications  Continuous Medications[2]  PRN medications  PRN Medications[3]      Investigations:  Labs, radiological imaging and cardiac work up were personally  reviewed    Results from last 7 days   Lab Units 05/20/25  0912 05/19/25  1139 05/18/25  0659   SODIUM mmol/L 142 141 144   POTASSIUM mmol/L 3.4* 3.9 3.9   CHLORIDE mmol/L 102 106 105   CO2 mmol/L 29 25 30   BUN mg/dL 26* 28* 25*   CREATININE mg/dL 1.43* 1.40* 1.48*   GLUCOSE mg/dL 99 257* 96   CALCIUM mg/dL 8.4* 8.1* 8.0*     Results from last 7 days   Lab Units 05/20/25  0912   WBC AUTO x10*3/uL 9.8   HEMOGLOBIN g/dL 9.5*   HEMATOCRIT % 31.5*   PLATELETS AUTO x10*3/uL 252         Imaging  XR chest 2 views  Result Date: 5/20/2025  1.  Improvement of left lower lung airspace disease. Persistent small effusions probably with posterior basilar atelectasis.   Signed by: Ashish Steinberg 5/20/2025 10:25 AM Dictation workstation:   [id number]    CT chest abdomen pelvis wo IV contrast  Result Date: 5/19/2025  Chest 1.  Bilateral layering effusions with adjacent atelectasis and potential pneumonia. 2. Cardiomegaly. 3. Scattered arterial vascular calcifications. 4. Potential multinodular thyroid gland although poorly assessed.   Abdomen-Pelvis 1.  No acute significant traumatic abnormality. 2. Multifocal probably benign renal hypodensities favoring cysts although some are too small to characterize.     MACRO: Incidental Finding:  A small focal renal lesion seen, too small to further characterize, but given the homogeneous attenuation likely represent benign etiology/simple cyst.  (**-YCF-**)   Instructions:  No further workup is needed. (Marielena BR, Christi SG, Tito NM, et al. Management of the Incidental Renal Mass on CT: A White Paper of the ACR Incidental Findings Committee. J Am Aryan Radiol. 2018;15(2):264-273.) [email or web address]   Signed by: Noel Johns 5/19/2025 12:28 PM Dictation workstation:   [id number]    CT head W O contrast trauma protocol  Result Date: 5/19/2025  Head: No acute intracranial abnormality or calvarial fracture was identified.   Atrophy and nonspecific low-density white matter  changes.   Mild mucosal thickening or secretions paranasal sinuses.   Cervical spine: About 2 mm C4, C6 and C7 anterolisthesis.   No acute fracture.   No acute paravertebral hematoma.   Multilevel degenerative changes.   Scattered arterial vascular calcifications.   MACRO: None   Signed by: Noel Johns 5/19/2025 12:17 PM Dictation workstation:   [id number]    CT cervical spine wo IV contrast  Result Date: 5/19/2025  Head: No acute intracranial abnormality or calvarial fracture was identified.   Atrophy and nonspecific low-density white matter changes.   Mild mucosal thickening or secretions paranasal sinuses.   Cervical spine: About 2 mm C4, C6 and C7 anterolisthesis.   No acute fracture.   No acute paravertebral hematoma.   Multilevel degenerative changes.   Scattered arterial vascular calcifications.   MACRO: None   Signed by: Noel Johns 5/19/2025 12:17 PM Dictation workstation:   [id number]    XR chest 1 view  Result Date: 5/16/2025  1. Slightly improved layering left-greater-than-right pleural effusion with surrounding atelectasis.   Signed by: Vincent Vega 5/16/2025 7:53 AM Dictation workstation:   [id number]    XR chest 1 view  Result Date: 5/14/2025  Patchy bilateral perihilar and basilar infiltrates and probable small effusions.   MACRO: none   Signed by: Jan Michaels 5/14/2025 10:29 AM Dictation workstation:   [id number]      Cardiology, Vascular, and Other Imaging  ECG 12 lead  Result Date: 5/20/2025  Sinus rhythm Atrial premature complex Borderline right axis deviation Probable LVH with secondary repol abnrm Anterior Q waves, possibly due to LVH See ED provider note for full interpretation and clinical correlation Confirmed by Christiana Cruz ([id number]) on 5/20/2025 11:09:43 AM    Transthoracic Echo Complete  Result Date: 5/14/2025   [facility], [address] [place] [id number]           Tel [phone] and Fax [phone] TRANSTHORACIC ECHOCARDIOGRAM REPORT  Patient  Name:       OLIVIA EAST          Mateo Physician:    [id number] Michael Fung MD Study Date:         5/14/2025           Ordering Provider:    [id number] ROGELIO BERNAL MRN/PID:            [id number]            Fellow: Accession#:         [id number]        Nurse: Date of Birth/Age:  12/26/1924 / 100    Sonographer:          Teresa faustin RDCS Gender assigned at  F                   Additional Staff: Birth: Height:                                 Admit Date:           5/14/2025 Weight:             45.36 kg            Admission Status:     Inpatient -                                                               Routine BSA / BMI:          1.27 m2 / kg/m2     Encounter#:           [id number] Blood Pressure:     258/105 mmHg        Department Location:  [facility] Study Type:    TRANSTHORACIC ECHO (TTE) COMPLETE Diagnosis/ICD: Unspecified systolic (congestive) heart failure (CHF)-I50.20 Indication:    SOB CPT Code:      Echo Complete w Full Doppler-[id number] Patient History: Pertinent History: HTN. Study Detail: The following Echo studies were performed: 2D, M-Mode, Doppler and               color flow. Technically challenging study due to patient lying in               supine position.  PHYSICIAN INTERPRETATION: Left Ventricle: Left ventricular ejection fraction is normal, by visual estimate at 65-70%. There is severely increased concentric left ventricular hypertrophy. There are no regional left ventricular wall motion abnormalities. The left ventricular cavity size is normal. Left ventricular diastolic filling is indeterminate. Left Atrium: The left atrial size is moderately dilated. Right Ventricle: The right ventricle is normal in size. There is normal right ventricular global systolic function. Right Atrium: The right atrium is normal in  size. Aortic Valve: The aortic valve is trileaflet. There is mild aortic valve cusp calcification. There is trace aortic valve regurgitation. The peak instantaneous gradient of the aortic valve is 14 mmHg. Mitral Valve: The mitral valve is normal in structure. There is mild mitral annular calcification. There is moderate mitral valve regurgitation. Tricuspid Valve: The tricuspid valve is structurally normal. There is moderate tricuspid regurgitation. The Doppler estimated RVSP is moderate to severely elevated at 61.4 mmHg. Pulmonic Valve: The pulmonic valve is not well visualized. The pulmonic valve regurgitation was not well visualized. Pericardium: There is no pericardial effusion noted. Aorta: The aortic root is normal.  CONCLUSIONS:  1. There is severely increased concentric left ventricular hypertrophy.  2. Left ventricular ejection fraction is normal, by visual estimate at 65-70%.  3. Moderate mitral valve regurgitation.  4. Moderate tricuspid regurgitation visualized.  5. Moderate to severely elevated right ventricular systolic pressure. QUANTITATIVE DATA SUMMARY:  2D MEASUREMENTS:          Normal Ranges: Ao Root d:       2.80 cm  (2.0-3.7cm) LAs:             4.50 cm  (2.7-4.0cm) IVSd:            1.92 cm  (0.6-1.1cm) LVPWd:           1.88 cm  (0.6-1.1cm) LVIDd:           3.58 cm  (3.9-5.9cm) LVIDs:           2.57 cm LV Mass Index:   233 g/m2 LVEDV Index:     29 ml/m2 LV % FS          28.2 %  LEFT ATRIUM:                  Normal Ranges: LA Vol A4C:        88.4 ml    (22+/-6mL/m2) LA Vol A2C:        89.3 ml LA Vol BP:         91.7 ml LA Vol Index A4C:  69.5ml/m2 LA Vol Index A2C:  70.2 ml/m2 LA Vol Index BP:   72.1 ml/m2 LA Area A4C:       25.8 cm2 LA Area A2C:       25.1 cm2 LA Major Axis A4C: 6.4 cm LA Major Axis A2C: 6.0 cm LA Vol A4C:        86.1 ml LA Vol A2C:        80.8 ml LA Vol Index BSA:  65.6 ml/m2  RIGHT ATRIUM:          Normal Ranges: RA Area A4C:  12.3 cm2  M-MODE MEASUREMENTS:         Normal  Ranges: Ao Root:             3.00 cm (2.0-3.7cm) LAs:                 5.10 cm (2.7-4.0cm)  AORTA MEASUREMENTS:         Normal Ranges: Asc Ao, d:          2.90 cm (2.1-3.4cm)  LV SYSTOLIC FUNCTION:                      Normal Ranges: EF-A4C View:    74 % (>=55%) EF-A2C View:    82 % EF-Biplane:     81 % EF-Visual:      68 % LV EF Reported: 68 %  LV DIASTOLIC FUNCTION:           Normal Ranges: MV Peak A:             0.61 m/s  (0.42-0.7 m/s) MV e'                  0.061 m/s (>8.0) MV lateral e'          0.06 m/s MV medial e'           0.06 m/s  MITRAL VALVE:          Normal Ranges: MV DT:        156 msec (150-240msec)  MITRAL INSUFFICIENCY:             Normal Ranges: MR Vmax:              599.50 cm/s  AORTIC VALVE:           Normal Ranges: AoV Vmax:     1.90 m/s  (<=1.7m/s) AoV Peak P.4 mmHg (<20mmHg) LVOT Max Delta: 0.92 m/s  (<=1.1m/s) LVOT VTI:     15.50 cm  RIGHT VENTRICLE: RV Basal 3.06 cm RV Mid   1.89 cm RV Major 5.4 cm TAPSE:   19.1 mm RV s'    0.14 m/s  TRICUSPID VALVE/RVSP:          Normal Ranges: Peak TR Velocity:     3.82 m/s RV Syst Pressure:     61 mmHg  (< 30mmHg)  [id number] Michael Fung MD Electronically signed on 2025 at 2:56:22 PM  ** Final **         Assessment  Nae Samayoa  y.o. female  on day  1 of admission presenting with Aspiration pneumonia (Multi). Actively being treated for the  following medical Problems:    Acute hypoxic respiratory failure improved  Bilateral pulmonary infiltrate groundglass pattern predominantly at the lower bases posterior segment consistent with most likely aspiration  Bilateral pleural effusion  Newly diagnosed heart failure  Choking episodes with possible Heimlich maneuver/negative pressure pulmonary edema give patient 20 mg of IV Lasix      Recommendation:  Patient was weaned off oxygen over the last 24 hours   Swallow evaluation/plan was repeated, appreciated.  Transition to p.o. Augmentin   discontinue IV Zosyn/azithromycin   Bronchodilator  therapy  Aggressive bronchopulmonary hygiene with Mucomyst/EZ Pap  Bedside swallow evaluation was done by patient RN and patient cleared oxygen for saturation of 89 to 94%  Incentive spirometry  Bronchodilators therapy as needed  PT/OT  DVT prophylaxis  Minimize benzodiazepine and narcotics  Encourage ambulation  Head evaluation and aspiration precautions.        Tere Hill MD  Pulmonary critical care consultant  05/20/25  7:16 PM       STAFF PHYSICIAN NOTE OF PERSONAL INVOLVEMENT IN CARE  As the attending physician, I certify that I personally reviewed the patient's history and personally examined the patient to confirm the physical findings described above, and that I reviewed the relevant imaging studies and available reports.  I also discussed the differential diagnosis and all of the proposed management plans with the patient and individuals accompanying the patient to this visit.  They had the opportunity to ask questions about the proposed management plans and to have those questions answered.           [1] acetylcysteine, 6 mL, nebulization, TID  amLODIPine, 10 mg, oral, Daily  [START ON 5/21/2025] azithromycin, 250 mg, oral, Daily  furosemide, 10 mg, oral, Daily  insulin lispro, 0-5 Units, subcutaneous, TID AC  ipratropium-albuteroL, 3 mL, nebulization, TID  losartan, 100 mg, oral, Daily  metoprolol tartrate, 100 mg, oral, BID  oxygen, , inhalation, Continuous - Inhalation  piperacillin-tazobactam, 2.25 g, intravenous, q6h  psyllium, 1 packet, oral, Daily     [2]    [3] PRN medications: acetaminophen **OR** acetaminophen **OR** acetaminophen, dextrose, dextrose, glucagon, glucagon, ipratropium-albuteroL

## 2025-05-20 NOTE — CARE PLAN
The patient's goals for the shift include rest.    The clinical goals for the shift include pt will be safe and comftorable      Problem: Pain - Adult  Goal: Verbalizes/displays adequate comfort level or baseline comfort level  Outcome: Progressing     Problem: Safety - Adult  Goal: Free from fall injury  Outcome: Progressing     Problem: Discharge Planning  Goal: Discharge to home or other facility with appropriate resources  Outcome: Progressing     Problem: Chronic Conditions and Co-morbidities  Goal: Patient's chronic conditions and co-morbidity symptoms are monitored and maintained or improved  Outcome: Progressing     Problem: Nutrition  Goal: Nutrient intake appropriate for maintaining nutritional needs  Outcome: Progressing     Problem: Skin  Goal: Prevent/minimize sheer/friction injuries  Outcome: Progressing  Goal: Promote/optimize nutrition  Outcome: Progressing  Goal: Promote skin healing  Outcome: Progressing

## 2025-05-20 NOTE — CARE PLAN
The patient's goals for the shift include rest.    The clinical goals for the shift include safety, comfort, and pain control.    Pain - Adult  Add All  Verbalizes/displays adequate comfort level or baseline comfort level  Add  Today at 0050 - Progressing by Moy Webster RN  Add  Safety - Adult  Add All  Free from fall injury  Add  Today at 0050 - Progressing by Moy Webster RN  Add  Discharge Planning  Add All  Discharge to home or other facility with appropriate resources  Add  Today at 0050 - Progressing by Moy Webster RN  Add  Chronic Conditions and Co-morbidities  Add All  Patient's chronic conditions and co-morbidity symptoms are monitored and maintained or improved  Add  Today at 0050 - Progressing by Moy Webster RN  Add  Nutrition  Add All  Nutrient intake appropriate for maintaining nutritional needs  Add  Today at 0050 - Progressing by Moy Webster RN  Add  Skin  Add All  Prevent/minimize sheer/friction injuries  Add  Today at 0050 - Progressing by Moy Webster RN  Add  Flowsheets  Taken today at 0050  Prevent/minimize sheer/friction injuries  HOB 30 degrees or less  Promote/optimize nutrition  Add  Today at 0050 - Progressing by Moy Webster RN  Add  Flowsheets  Taken today at 0050  Promote/optimize nutrition  Offer water/supplements/favorite foods  Promote skin healing  Add  Today at 0050 - Progressing by Moy Webster RN  Add  Flowsheets  Taken today at 0050  Promote skin healing  Assess skin/pad under line(s)/device(s)

## 2025-05-20 NOTE — PROGRESS NOTES
Occupational Therapy    Evaluation    Patient Name: Nae Samayoa  MRN: [id number]  Today's Date: 5/20/2025  Time Calculation  Start Time: 0912  Stop Time: 0923  Time Calculation (min): 11 min  718/718-A    Assessment  IP OT Assessment  OT Assessment: Patient presents with a decline in functional status and would benefit from 24 hour care with O.T. services at a moderate intensity to improve independence with ADLs, transfers & mobility.  Prognosis: Good  Barriers to Discharge Home: Caregiver assistance, Cognition needs, Physical needs  Caregiver Assistance: Caregiver assistance needed per identified barriers - however, level of patient's required assistance exceeds assistance available at home  Cognition Needs: 24hr supervision for safety awareness needed, Cognition-related high falls risk  Physical Needs: 24hr mobility assistance needed, 24hr ADL assistance needed  End of Session Communication: Bedside nurse  End of Session Patient Position: On cart (transporter present to take patient for test)    Plan:  Treatment Interventions: ADL retraining, Functional transfer training, Endurance training, Neuromuscular reeducation, Compensatory technique education  OT Frequency: 3 times per week  OT Discharge Recommendations: Moderate intensity level of continued care, 24 hr supervision due to cognition  OT - OK to Discharge: Yes (from an O.T. standpoint)    Subjective     Current Problem:  1. Aspiration into airway, sequela            General:  General  Reason for Referral: OT eval & treat for ADLs/safety (Aspiration pneumonia, hypoxia, encephalopathy)  PMH: CHF, CKD,   Referred By: Sebastián Avitia PA-C  Past Medical History Relevant to Rehab: 5/19/25 syncopal episode, eating a bagel became unresponsive  Family/Caregiver Present: No  Co-Treatment: PT  Co-Treatment Reason: To maximize patient safety & mobility  Prior to Session Communication: Bedside nurse  Patient Position Received: Bed, 2 rail up, Alarm on  General Comment:  Patient cleared for therapy by nursing.    Precautions:  Medical Precautions: Fall precautions  Precautions Comment: Ambulate with assist    Vital Signs:  Vital Signs Comment: VSS    Pain:  Pain Assessment  Pain Assessment: 0-10  0-10 (Numeric) Pain Score: 0 - No pain    Objective     Cognition:  Overall Cognitive Status:  (Oriented to self, confused, follows one step commands with cues, impaired safety awareness.)        Home Living:  Home Living Comments: Per EMR: lives with son in 1st floor apartment. Independent ADLs, transfers & mobility; Tub/shower with hand held shower, standard height toilet.     Prior Function:  Level of [place]:  (Per EMR, independent ADLs, transfers & mobility)     ADL:  Grooming Assistance: Minimal  UE Dressing Assistance: Moderate  LE Dressing Assistance: Maximal    Activity Tolerance:  Endurance: Decreased tolerance for upright activites    Bed Mobility/Transfers:   Bed Mobility  Bed Mobility:  (mod assist x 2 supine <-> sit)  Transfers  Transfer:  (min assist x 2 sit to stand from edge of bed, retropulsive; difficulty extending trunk and B knees, high fall risk.)    Ambulation/Gait Training:  Functional Mobility  Functional Mobility Performed:  (min assist x 2 with BUE hand held assist, retropulsive, unsteady, high fall risk)    Sitting Balance:  Dynamic Sitting Balance  Dynamic Sitting-Balance Support: Feet supported  Dynamic Sitting-Level of Assistance: Contact guard    Standing Balance:  Dynamic Standing Balance  Dynamic Standing-Level of Assistance: Minimum assistance (x2 with BUE support)    Sensation:  Light Touch: No apparent deficits    Strength:  Strength Comments: JENNIFER grossly WFL      Outcome Measures: [facility] Daily Activity  Putting on and taking off regular lower body clothing: A lot  Bathing (including washing, rinsing, drying): A lot  Putting on and taking off regular upper body clothing: A lot  Toileting, which includes using toilet, bedpan or urinal: A lot  Taking  care of personal grooming such as brushing teeth: A little  Eating Meals: A little  Daily Activity - Total Score: 14           EDUCATION:     Education Documentation  ADL Training, taught by Felicia Hoffman OT at 5/20/2025  1:32 PM.  Learner: Patient  Readiness: Acceptance  Method: Explanation, Demonstration  Response: Needs Reinforcement  Comment: Safety during functional transfersOT POC    Education Comments  No comments found.        Goals:   Encounter Problems       Encounter Problems (Active)       OT Goals       Increase bed mobility & functional transfers to/from bed, chair & commode to CGA with DME for safety  (Progressing)       Start:  05/20/25    Expected End:  06/03/25            Increase UE bathing/dressing/grooming to SBA and LE bathing/dressing to mod assist with adaptive equipment as needed.  (Progressing)       Start:  05/20/25    Expected End:  06/03/25            Increase dynamic stand balance to CGA with UE support to promote increased independence with ADL & functional transfers  (Progressing)       Start:  05/20/25    Expected End:  06/03/25

## 2025-05-21 ENCOUNTER — APPOINTMENT (OUTPATIENT)
Dept: CARDIOLOGY | Facility: HOSPITAL | Age: OVER 89
DRG: 177 | End: 2025-05-21
Payer: MEDICARE

## 2025-05-21 ENCOUNTER — DOCUMENTATION (OUTPATIENT)
Dept: HOME HEALTH SERVICES | Facility: HOME HEALTH | Age: OVER 89
End: 2025-05-21
Payer: MEDICARE

## 2025-05-21 ENCOUNTER — HOSPITAL ENCOUNTER (OUTPATIENT)
Dept: CARDIOLOGY | Facility: HOSPITAL | Age: OVER 89
Discharge: HOME | End: 2025-05-21
Payer: MEDICARE

## 2025-05-21 ENCOUNTER — HOME HEALTH ADMISSION (OUTPATIENT)
Dept: HOME HEALTH SERVICES | Facility: HOME HEALTH | Age: OVER 89
End: 2025-05-21
Payer: MEDICARE

## 2025-05-21 VITALS
DIASTOLIC BLOOD PRESSURE: 58 MMHG | BODY MASS INDEX: 18.88 KG/M2 | WEIGHT: 100 LBS | RESPIRATION RATE: 18 BRPM | HEIGHT: 61 IN | OXYGEN SATURATION: 94 % | SYSTOLIC BLOOD PRESSURE: 138 MMHG | TEMPERATURE: 97.2 F | HEART RATE: 78 BPM

## 2025-05-21 DIAGNOSIS — J69.0: ICD-10-CM

## 2025-05-21 LAB
GLUCOSE BLD MANUAL STRIP-MCNC: 107 MG/DL (ref 74–99)
GLUCOSE BLD MANUAL STRIP-MCNC: 108 MG/DL (ref 74–99)
GLUCOSE BLD MANUAL STRIP-MCNC: 153 MG/DL (ref 74–99)
GLUCOSE BLD MANUAL STRIP-MCNC: 181 MG/DL (ref 74–99)
GLUCOSE BLD MANUAL STRIP-MCNC: 95 MG/DL (ref 74–99)
MAGNESIUM SERPL-MCNC: 1.62 MG/DL (ref 1.6–2.4)
POTASSIUM SERPL-SCNC: 3.5 MMOL/L (ref 3.5–5.3)

## 2025-05-21 PROCEDURE — 94640 AIRWAY INHALATION TREATMENT: CPT

## 2025-05-21 PROCEDURE — 97116 GAIT TRAINING THERAPY: CPT | Mod: GP,CQ

## 2025-05-21 PROCEDURE — 2500000001 HC RX 250 WO HCPCS SELF ADMINISTERED DRUGS (ALT 637 FOR MEDICARE OP): Performed by: INTERNAL MEDICINE

## 2025-05-21 PROCEDURE — 2500000001 HC RX 250 WO HCPCS SELF ADMINISTERED DRUGS (ALT 637 FOR MEDICARE OP): Performed by: PHYSICIAN ASSISTANT

## 2025-05-21 PROCEDURE — 2500000002 HC RX 250 W HCPCS SELF ADMINISTERED DRUGS (ALT 637 FOR MEDICARE OP, ALT 636 FOR OP/ED): Mod: JZ | Performed by: INTERNAL MEDICINE

## 2025-05-21 PROCEDURE — 82947 ASSAY GLUCOSE BLOOD QUANT: CPT

## 2025-05-21 PROCEDURE — 2500000005 HC RX 250 GENERAL PHARMACY W/O HCPCS: Performed by: EMERGENCY MEDICINE

## 2025-05-21 PROCEDURE — 2500000004 HC RX 250 GENERAL PHARMACY W/ HCPCS (ALT 636 FOR OP/ED): Performed by: EMERGENCY MEDICINE

## 2025-05-21 PROCEDURE — 84132 ASSAY OF SERUM POTASSIUM: CPT | Performed by: NURSE PRACTITIONER

## 2025-05-21 PROCEDURE — 93005 ELECTROCARDIOGRAM TRACING: CPT

## 2025-05-21 PROCEDURE — 36415 COLL VENOUS BLD VENIPUNCTURE: CPT | Performed by: NURSE PRACTITIONER

## 2025-05-21 PROCEDURE — 2500000002 HC RX 250 W HCPCS SELF ADMINISTERED DRUGS (ALT 637 FOR MEDICARE OP, ALT 636 FOR OP/ED): Performed by: PHYSICIAN ASSISTANT

## 2025-05-21 PROCEDURE — 97535 SELF CARE MNGMENT TRAINING: CPT | Mod: CO,GO

## 2025-05-21 PROCEDURE — 83735 ASSAY OF MAGNESIUM: CPT | Performed by: NURSE PRACTITIONER

## 2025-05-21 RX ORDER — AMOXICILLIN AND CLAVULANATE POTASSIUM 500; 125 MG/1; MG/1
1 TABLET, FILM COATED ORAL 2 TIMES DAILY
Qty: 20 TABLET | Refills: 0 | Status: SHIPPED | OUTPATIENT
Start: 2025-05-21 | End: 2025-05-31

## 2025-05-21 RX ADMIN — ACETYLCYSTEINE 6 ML: 100 SOLUTION ORAL; RESPIRATORY (INHALATION) at 13:29

## 2025-05-21 RX ADMIN — METOPROLOL TARTRATE 100 MG: 100 TABLET, FILM COATED ORAL at 08:45

## 2025-05-21 RX ADMIN — AMLODIPINE BESYLATE 10 MG: 10 TABLET ORAL at 08:45

## 2025-05-21 RX ADMIN — INSULIN LISPRO 1 UNITS: 100 INJECTION, SOLUTION INTRAVENOUS; SUBCUTANEOUS at 12:28

## 2025-05-21 RX ADMIN — AMOXICILLIN AND CLAVULANATE POTASSIUM 1 TABLET: 500; 125 TABLET, FILM COATED ORAL at 08:45

## 2025-05-21 RX ADMIN — IPRATROPIUM BROMIDE AND ALBUTEROL SULFATE 3 ML: .5; 3 SOLUTION RESPIRATORY (INHALATION) at 07:57

## 2025-05-21 RX ADMIN — FUROSEMIDE 10 MG: 20 TABLET ORAL at 08:45

## 2025-05-21 RX ADMIN — LOSARTAN POTASSIUM 100 MG: 50 TABLET, FILM COATED ORAL at 08:45

## 2025-05-21 RX ADMIN — ACETYLCYSTEINE 6 ML: 100 SOLUTION ORAL; RESPIRATORY (INHALATION) at 07:57

## 2025-05-21 RX ADMIN — Medication 21 PERCENT: at 07:57

## 2025-05-21 RX ADMIN — PSYLLIUM HUSK 1 PACKET: 3.4 POWDER ORAL at 08:45

## 2025-05-21 RX ADMIN — IPRATROPIUM BROMIDE AND ALBUTEROL SULFATE 3 ML: .5; 3 SOLUTION RESPIRATORY (INHALATION) at 13:26

## 2025-05-21 ASSESSMENT — COGNITIVE AND FUNCTIONAL STATUS - GENERAL
TURNING FROM BACK TO SIDE WHILE IN FLAT BAD: A LITTLE
EATING MEALS: A LITTLE
MOBILITY SCORE: 14
DRESSING REGULAR UPPER BODY CLOTHING: A LITTLE
DRESSING REGULAR LOWER BODY CLOTHING: A LITTLE
CLIMB 3 TO 5 STEPS WITH RAILING: A LOT
DAILY ACTIVITIY SCORE: 20
HELP NEEDED FOR BATHING: A LITTLE
DRESSING REGULAR LOWER BODY CLOTHING: A LITTLE
MOVING TO AND FROM BED TO CHAIR: A LITTLE
HELP NEEDED FOR BATHING: A LITTLE
DAILY ACTIVITIY SCORE: 18
STANDING UP FROM CHAIR USING ARMS: A LITTLE
MOVING FROM LYING ON BACK TO SITTING ON SIDE OF FLAT BED WITH BEDRAILS: A LITTLE
TURNING FROM BACK TO SIDE WHILE IN FLAT BAD: A LITTLE
MOBILITY SCORE: 18
TOILETING: A LITTLE
STANDING UP FROM CHAIR USING ARMS: A LOT
DRESSING REGULAR UPPER BODY CLOTHING: A LITTLE
PERSONAL GROOMING: A LITTLE
WALKING IN HOSPITAL ROOM: A LITTLE
CLIMB 3 TO 5 STEPS WITH RAILING: A LOT
MOVING TO AND FROM BED TO CHAIR: A LOT
WALKING IN HOSPITAL ROOM: A LOT
TOILETING: A LITTLE

## 2025-05-21 ASSESSMENT — PAIN SCALES - GENERAL
PAINLEVEL_OUTOF10: 0 - NO PAIN

## 2025-05-21 ASSESSMENT — ACTIVITIES OF DAILY LIVING (ADL): HOME_MANAGEMENT_TIME_ENTRY: 15

## 2025-05-21 ASSESSMENT — PAIN - FUNCTIONAL ASSESSMENT: PAIN_FUNCTIONAL_ASSESSMENT: 0-10

## 2025-05-21 NOTE — HH CARE COORDINATION
UPDATE   Home Care received a Referral for Nursing and Physical Therapy. We have processed the referral for a Start of Care on 05-23-25, 24-48 hours.     If you have any questions or concerns, please feel free to contact us at [phone]. Follow the prompts, enter your five digit zip code, and you will be directed to your care team on [facility] 3.

## 2025-05-21 NOTE — CARE PLAN
The patient's goals for the shift include rest.    The clinical goals for the shift include safety, comfort, and pain control.    Pain - Adult  Add All  Verbalizes/displays adequate comfort level or baseline comfort level  Add  Today at 0022 - Progressing by Moy Webster RN  Add  Safety - Adult  Add All  Free from fall injury  Add  Today at 0022 - Progressing by Moy Webster RN  Add  Discharge Planning  Add All  Discharge to home or other facility with appropriate resources  Add  Today at 0022 - Progressing by Moy Webster RN  Add  Chronic Conditions and Co-morbidities  Add All  Patient's chronic conditions and co-morbidity symptoms are monitored and maintained or improved  Add  Today at 0022 - Progressing by Moy Webster RN  Add  Nutrition  Add All  Nutrient intake appropriate for maintaining nutritional needs  Add  Today at 0022 - Progressing by Moy Webster RN  Add  Skin  Add All  Prevent/minimize sheer/friction injuries  Add  Today at 0022 - Progressing by Moy Webster RN  Add  Flowsheets  Taken today at 0022  Prevent/minimize sheer/friction injuries  HOB 30 degrees or less  Promote/optimize nutrition  Add  Today at 0022 - Progressing by Moy Webster RN  Add  Flowsheets  Taken today at 0022  Promote/optimize nutrition  Offer water/supplements/favorite foods  Promote skin healing  Add  Today at 0022 - Progressing by Moy Webster RN  Add  Flowsheets  Taken today at 0022  Promote skin healing  Assess skin/pad under line(s)/device(s)   present and adequate

## 2025-05-21 NOTE — PROGRESS NOTES
Nae Samayoa is a 100 y.o. female on day 2 of admission presenting with Aspiration pneumonia (Multi).      Subjective   Patient is awake and alert oriented x 1-2, denies any discomfort.  His son is at the bedside.  She was admitted to the diagnosis of aspiration pneumonia and she was on high percentage of oxygen during the night but currently on room air, has no respiratory complaints.  Does not have any cough or wheezing.       Objective   Awake and alert oriented x 1,    Lungs bilateral clear auscultation    Heart regular S1-S2    Abdomen soft and nontender    Extremities no edema  Last Recorded Vitals  BP (!) 195/83   Pulse 69   Temp 35.5 °C (95.9 °F)   Resp 18   Wt 45.4 kg (100 lb)   SpO2 92%   Intake/Output last 3 Shifts:    Intake/Output Summary (Last 24 hours) at 5/21/2025 0719  Last data filed at 5/20/2025 1734  Gross per 24 hour   Intake 100 ml   Output --   Net 100 ml       Admission Weight  Weight: 45.4 kg (100 lb) (05/19/25 1850)    Daily Weight  05/19/25 : 45.4 kg (100 lb)    Image Results  ECG 12 lead  Sinus rhythm  Atrial premature complex  Borderline right axis deviation  Probable LVH with secondary repol abnrm  Anterior Q waves, possibly due to LVH    See ED provider note for full interpretation and clinical correlation  Confirmed by Christiana Cruz ([id number]) on 5/20/2025 11:09:43 AM  XR chest 2 views  Narrative: Interpreted By:  Ashish Steinberg,   STUDY:  XR CHEST 2 VIEWS;  5/20/2025 9:47 am      INDICATION:  Signs/Symptoms:suspected aspiration pna.      COMPARISON:  05/16/2025      ACCESSION NUMBER(S):  [id number]      ORDERING CLINICIAN:  NAOMI ORDONEZ      FINDINGS:  CARDIOMEDIASTINAL SILHOUETTE AND VASCULATURE:      Cardiac size:  Mild cardiomegaly  Aortic shadow:  Within normal limits.      Mediastinal contours: Within normal limits.      Pulmonary vasculature:  The central vasculature is unremarkable      LUNGS:  There is opacification of the lung bases posteriorly with  blunted  costophrenic angles. This is most likely from small effusions and  atelectasis. This has improved on the left particularly with  decreased opacification at the left retrocardiac region. The lungs  otherwise are clear.      ABDOMEN AND OTHER FINDINGS:  No remarkable upper abdominal findings.      BONES:  No acute osseous changes.      Impression: 1.  Improvement of left lower lung airspace disease. Persistent small  effusions probably with posterior basilar atelectasis.      Signed by: Ashish Steinberg 5/20/2025 10:25 AM  Dictation workstation:   [id number]      Physical Exam    Relevant Results                #1 aspiration pneumonia, currently on IV Zosyn, patient to be evaluated by speech therapy currently on clear liquid diet    2.  Acute respiratory failure that has resolved and currently on room air    If passes the speech therapy patient will be started on a diet, PT has already evaluated the patient.  Hopefully patient can be discharged home in the next 24 hours.                  Assessment & Plan  Aspiration pneumonia (Multi)    Hypoxia    HTN (hypertension)    CHF (congestive heart failure)    Hyperglycemia    Anemia    Encephalopathy               Dru Mcallister MD

## 2025-05-21 NOTE — PROGRESS NOTES
Reviewed pulmonary Critical Care note    Patient Name :Nae Samayoa  Date of service : 05/21/25  MRN: [id number]  YOB: 1924      Interval History:    History of Present Illness:      100 y.o. female  on day  2 of admission presenting with Aspiration pneumonia (Multi).  Past medical history of hypertension, new onset CHF, CKD, with presented to the ED after she developed a syncope, she was being fed her breakfast by her son while eating a bagel she developed decreased unresponsiveness and possible choking, no head injury but patient lost consciousness, patient's son performed Heimlich which was unsuccessful, started mouth-to-mouth, patient arrival to ED was hypoxic but became gradually more alert, she stated that she did actually choke on her food, CODE STATUS established as DNR ml, chest CT showed bibasilar infiltrates/bilateral pleural effusion  Cardiomegaly and mild atelectatic changes  Past Medical/Surgical History:    has no past medical history on file.   has no past surgical history on file.   reports that she has never smoked. She has never used smokeless tobacco. No history on file for alcohol use and drug use.  Family History:   No relevant family history has been documented for this patient.    Allergies:     Patient has no known allergies.      Social history:   reports that she has never smoked. She has never used smokeless tobacco.      Review of Systems:   General: denies weight gain, denies loss of appetite, fever, chills, night sweats.  HEENT: denies headaches, dizziness, head trauma, visual changes, eye pain, tinnitus, nosebleeds, hoarseness or throat pain    Respiratory: denies chest pain, dyspnea, cough and hemoptysis  Cardiovascular: denies orthopnea, paroxysmal nocturnal dyspnea, leg swelling, and previous heart attack.    Gastrointestinal: denies pain, nausea vomiting, diarrhea, constipation, melena or bleeding.  Genitourinary: denies hematuria, frequency, urgency or  dysuria  Neurology: denies syncope, seizures, paralysis, paraesthesia   Endocrine: denies polyuria, polydipsia, skin or hair changes, and heat or cold intolerance  Musculoskeletal: denies joint pain, swelling, arthritis or myalgia  Hematologic: denies bleeding, adenopathy and easy bruising  Skin: denies rashes and skin discoloration  Psychiatry: denies depression    Physical Exam:   Vital Signs:   Vitals:    05/21/25 1515   BP: 138/58   Pulse:    Resp:    Temp:    SpO2:      Vitals:    05/19/25 1850   Weight: 45.4 kg (100 lb)         Input/Output:    Intake/Output Summary (Last 24 hours) at 5/21/2025 1727  Last data filed at 5/21/2025 1325  Gross per 24 hour   Intake 576 ml   Output --   Net 576 ml       Oxygen requirements:    Ventilator Information:  FiO2 (%):  [21 %] 21 %  Oxygen Therapy/Pulse Ox  Medical Gas Therapy: None (Room air)  FiO2 (%): 21 % (RA)  SpO2: 94 %  Patient Activity During SpO2 Measurement: At rest  Temp: 36.2 °C (97.2 °F)        General appearance: Not in pain or distress, in no respiratory distress    HEENT: Atraumatic/normocephalic, EOMI, ISABELA, pharynx clear, moist mucosa, redness of the uvula appreciated,   Neck: Supple, no jugular venous distension, lymphadenopathy, thyromegaly or carotid bruits  Chest: Patient is on Airvo, diminished bilateral breath sound with rhonchi  Cardiovascular: Normal S1, S2, regular rate and rhythm, no murmur, rub or gallop  Abdomen: Normal sounds present, soft, lax with no tenderness, no hepatosplenomegaly, and no masses  Extremities: No edema. Pulses are equally present.   Skin: intact, no rashes   Neurologic: Alert and oriented x 3, No focal deficit         Current Medications:   Scheduled medications  Scheduled Medications[1]  Continuous medications  Continuous Medications[2]  PRN medications  PRN Medications[3]      Investigations:  Labs, radiological imaging and cardiac work up were personally reviewed    Results from last 7 days   Lab Units 05/21/25  7704  05/20/25  0912 05/19/25  1139 05/18/25  0659   SODIUM mmol/L  --  142 141 144   POTASSIUM mmol/L 3.5 3.4* 3.9 3.9   CHLORIDE mmol/L  --  102 106 105   CO2 mmol/L  --  29 25 30   BUN mg/dL  --  26* 28* 25*   CREATININE mg/dL  --  1.43* 1.40* 1.48*   GLUCOSE mg/dL  --  99 257* 96   CALCIUM mg/dL  --  8.4* 8.1* 8.0*     Results from last 7 days   Lab Units 05/20/25  0912   WBC AUTO x10*3/uL 9.8   HEMOGLOBIN g/dL 9.5*   HEMATOCRIT % 31.5*   PLATELETS AUTO x10*3/uL 252         Imaging  XR chest 2 views  Result Date: 5/20/2025  1.  Improvement of left lower lung airspace disease. Persistent small effusions probably with posterior basilar atelectasis.   Signed by: Ashish Steinberg 5/20/2025 10:25 AM Dictation workstation:   [id number]    CT chest abdomen pelvis wo IV contrast  Result Date: 5/19/2025  Chest 1.  Bilateral layering effusions with adjacent atelectasis and potential pneumonia. 2. Cardiomegaly. 3. Scattered arterial vascular calcifications. 4. Potential multinodular thyroid gland although poorly assessed.   Abdomen-Pelvis 1.  No acute significant traumatic abnormality. 2. Multifocal probably benign renal hypodensities favoring cysts although some are too small to characterize.     MACRO: Incidental Finding:  A small focal renal lesion seen, too small to further characterize, but given the homogeneous attenuation likely represent benign etiology/simple cyst.  (**-YCF-**)   Instructions:  No further workup is needed. (Marielena BR, Christi SG, Tito NM, et al. Management of the Incidental Renal Mass on CT: A White Paper of the ACR Incidental Findings Committee. J Am Aryan Radiol. 2018;15(2):264-273.) [email or web address]   Signed by: Noel Johns 5/19/2025 12:28 PM Dictation workstation:   [id number]    CT head W O contrast trauma protocol  Result Date: 5/19/2025  Head: No acute intracranial abnormality or calvarial fracture was identified.   Atrophy and nonspecific low-density white matter changes.   Mild  mucosal thickening or secretions paranasal sinuses.   Cervical spine: About 2 mm C4, C6 and C7 anterolisthesis.   No acute fracture.   No acute paravertebral hematoma.   Multilevel degenerative changes.   Scattered arterial vascular calcifications.   MACRO: None   Signed by: Noel Johns 5/19/2025 12:17 PM Dictation workstation:   [id number]    CT cervical spine wo IV contrast  Result Date: 5/19/2025  Head: No acute intracranial abnormality or calvarial fracture was identified.   Atrophy and nonspecific low-density white matter changes.   Mild mucosal thickening or secretions paranasal sinuses.   Cervical spine: About 2 mm C4, C6 and C7 anterolisthesis.   No acute fracture.   No acute paravertebral hematoma.   Multilevel degenerative changes.   Scattered arterial vascular calcifications.   MACRO: None   Signed by: Noel Johns 5/19/2025 12:17 PM Dictation workstation:   [id number]    XR chest 1 view  Result Date: 5/16/2025  1. Slightly improved layering left-greater-than-right pleural effusion with surrounding atelectasis.   Signed by: Vincent Vega 5/16/2025 7:53 AM Dictation workstation:   [id number]      Cardiology, Vascular, and Other Imaging  Electrocardiogram, 12-lead  Result Date: 5/21/2025  Normal sinus rhythm ST & T wave abnormality, consider inferior ischemia Prolonged QT Abnormal ECG When compared with ECG of 20-MAY-2025 20:03, (unconfirmed) No significant change was found        Assessment  Nae Samayoa  y.o. female  on day  2 of admission presenting with Aspiration pneumonia (Multi). Actively being treated for the  following medical Problems:    Acute hypoxic respiratory failure improved  Bilateral pulmonary infiltrate groundglass pattern predominantly at the lower bases posterior segment consistent with most likely aspiration  Bilateral pleural effusion  Newly diagnosed heart failure  Choking episodes with possible Heimlich maneuver/negative pressure pulmonary edema give patient 20 mg of  IV Lasix      Recommendation:  Remains off oxygen  Okay to discharge  Swallow evaluation/plan was reviewed, appreciated. diet could be advanced   I discontinued IV Zosyn/azithromycin and transition to renally adjusted dose of p.o. Augmentin.  Bronchodilator therapy  Aggressive bronchopulmonary hygiene with Mucomyst/EZ Pap  Bedside swallow evaluation was done by patient RN and patient cleared oxygen for saturation of 89 to 94%  Incentive spirometry  Bronchodilators therapy as needed  PT/OT  DVT prophylaxis  Minimize benzodiazepine and narcotics  Encourage ambulation  Head evaluation and aspiration precautions.        Tere Hill MD  Pulmonary critical care consultant  05/21/25  5:27 PM       STAFF PHYSICIAN NOTE OF PERSONAL INVOLVEMENT IN CARE  As the attending physician, I certify that I personally reviewed the patient's history and personally examined the patient to confirm the physical findings described above, and that I reviewed the relevant imaging studies and available reports.  I also discussed the differential diagnosis and all of the proposed management plans with the patient and individuals accompanying the patient to this visit.  They had the opportunity to ask questions about the proposed management plans and to have those questions answered.           [1] acetylcysteine, 6 mL, nebulization, TID  amLODIPine, 10 mg, oral, Daily  amoxicillin-clavulanate, 1 tablet, oral, q12h CORNEL  furosemide, 10 mg, oral, Daily  insulin lispro, 0-5 Units, subcutaneous, TID AC  ipratropium-albuteroL, 3 mL, nebulization, TID  losartan, 100 mg, oral, Daily  metoprolol tartrate, 100 mg, oral, BID  psyllium, 1 packet, oral, Daily     [2]    [3] PRN medications: acetaminophen **OR** acetaminophen **OR** acetaminophen, dextrose, dextrose, glucagon, glucagon, haloperidol lactate, ipratropium-albuteroL, oxygen

## 2025-05-21 NOTE — HH CARE COORDINATION
Home Care received a referral for Nursing, Physical Therapy, Occupational Therapy, and Home Health Aide. Unfortunately, we are unable to accept and process the referral at this time.    Reason:  Patient re-hospitalized prior to Start of Care    Patients, please reach out to the referring provider or your PCP to assist in obtaining an alternative home care agency and/or guidance to meet your needs.    Providers, please reach out to  Home Care at [phone] with any questions regarding the declined referral.

## 2025-05-21 NOTE — PROGRESS NOTES
Patient to see PT/OT again today per provider in hopes that's scores will be better and she can go home. Previous [facility] 12. If scores do not improve will discuss SNF with son.         5/21 3pm  Patient did much better with PT today. Awaiting SOC from [facility]. Anticipate discharge today.

## 2025-05-21 NOTE — PROGRESS NOTES
Physical Therapy    Physical Therapy Treatment    Patient Name: Nae Samayoa  MRN: [id number]  Department: [facility]  Room: [address] [place] [address]  Today's Date: 5/21/2025  Time Calculation  Start Time: 1411  Stop Time: 1434  Time Calculation (min): 23 min         Assessment/Plan   PT Assessment  End of Session Communication: Care Coordinator  End of Session Patient Position: Up in chair, Alarm on     PT Plan  Treatment/Interventions: Bed mobility, Transfer training, Gait training, Balance training, Therapeutic exercise, Therapeutic activity  PT Plan: Ongoing PT  PT Frequency: 3 times per week  PT Discharge Recommendations: Moderate intensity level of continued care  PT Recommended Transfer Status: Assist x2  PT - OK to Discharge: Yes (to next level of care when cleared by medical team)      General Visit Information:   General  Family/Caregiver Present: Yes (pt's son present)  Co-Treatment: OT  Co-Treatment Reason: to maximize pt safety& mobility  Prior to Session Communication: Bedside nurse  Patient Position Received: Bed, 2 rail up, Alarm off, not on at start of session    Subjective   Precautions:  Precautions  Medical Precautions: Fall precautions        Objective   Pain:  Pain Assessment  Pain Assessment: 0-10  0-10 (Numeric) Pain Score: 0 - No pain    Cognition:  Cognition  Overall Cognitive Status: Impaired     Treatments:  Bed Mobility  Bed Mobility:  (sup > sit with supervision;  HOB elevated)    Ambulation/Gait Training  Ambulation/Gait Training Performed:  (pt ambulates in hallway ~40 ft x 2 with FWW.  after seated rest break pt ambulates another 40 ft x 2 with RHHA plus addt'l 10 ft bathroom > chair /s AD.  min A x 1 throughout; pt tends to move somewhat impulsively with brisk yasmin though no LOB.)    Transfers  Transfer:  (sit <> stand x3 trials with CGA)    Outcome Measures:  [facility] Basic Mobility  Turning from your back to your side while in a flat bed without using bedrails: None  Moving from lying on your back to  sitting on the side of a flat bed without using bedrails: A little  Moving to and from bed to chair (including a wheelchair): A little  Standing up from a chair using your arms (e.g. wheelchair or bedside chair): A little  To walk in hospital room: A little  Climbing 3-5 steps with railing: A lot  Basic Mobility - Total Score: 18    Education Documentation  Precautions, taught by Tawana Arriaga PTA at 5/21/2025  2:43 PM.  Learner: Patient  Readiness: Acceptance  Method: Explanation  Response: Verbalizes Understanding    Mobility Training, taught by Tawana Arriaga PTA at 5/21/2025  2:43 PM.  Learner: Patient  Readiness: Acceptance  Method: Explanation  Response: Verbalizes Understanding    Education Comments  No comments found.        EDUCATION:       Encounter Problems       Encounter Problems (Active)       PT Problem       STG - Pt will transition supine <> sitting with supervision  (Progressing)       Start:  05/20/25    Expected End:  06/03/25            STG - Pt will transfer STS with sba  (Progressing)       Start:  05/20/25    Expected End:  06/03/25            STG - Pt will amb >=40' x7drbdq ww with sba  (Progressing)       Start:  05/20/25    Expected End:  06/03/25            STG - Pt will perform 2-3 sets of BLE therex x10 to maximize functional strength and independence  (Progressing)       Start:  05/20/25    Expected End:  06/03/25            STG - Pt will maintain standing supported static balance >=3minutes with supervision  (Progressing)       Start:  05/20/25    Expected End:  06/03/25

## 2025-05-21 NOTE — PROGRESS NOTES
Occupational Therapy    OT Treatment    Patient Name: Nae Samayoa  MRN: [id number]  Department: [facility]  Room: [address] [place] [address]  Today's Date: 5/21/2025  Time Calculation  Start Time: 1412  Stop Time: 1435  Time Calculation (min): 23 min        Assessment:  End of Session Communication: Care Coordinator  End of Session Patient Position: Up in chair, Alarm on     Plan:  Treatment Interventions: ADL retraining, Functional transfer training, Endurance training, Neuromuscular reeducation, Compensatory technique education  OT Frequency: 3 times per week  OT Discharge Recommendations: Moderate intensity level of continued care, 24 hr supervision due to cognition  OT - OK to Discharge: Yes (from an O.T. standpoint)  Treatment Interventions: ADL retraining, Functional transfer training, Endurance training, Neuromuscular reeducation, Compensatory technique education    Subjective   OT Visit Info:  OT Received On: 05/21/25  General Visit Info:  General  Family/Caregiver Present: Yes (son present)  Co-Treatment: PT  Co-Treatment Reason: to maximize pt safety& mobility  Prior to Session Communication: Bedside nurse  Patient Position Received: Bed, 2 rail up, Alarm off, not on at start of session  General Comment: pleasant and cooperative  Precautions:  Medical Precautions: Fall precautions        Pain:  Pain Assessment  0-10 (Numeric) Pain Score: 0 - No pain    Objective    Cognition:  Cognition  Orientation Level: Disoriented to situation       Activities of Daily Living: Grooming  Grooming Level of Assistance: Setup  Grooming Where Assessed: Standing sinkside    LE Dressing  LE Dressing: Yes  Pants Level of Assistance: Minimum assistance  LE Dressing Where Assessed: Edge of bed    Toileting  Toileting Level of Assistance: Minimum assistance  Where Assessed: Toilet  Functional Standing Tolerance:  Time: 4:00 standing with FWW on first trial and without FWW on second trial  Bed Mobility/Transfers: Bed Mobility  Bed Mobility: Yes  Bed  Mobility 1  Bed Mobility 1: Supine to sitting  Level of Assistance 1: Distant supervision    Transfers  Transfer: Yes  Transfer 1  Technique 1: Sit to stand, Stand to sit  Transfer Level of Assistance 1: Contact guard    Toilet Transfers  Toilet Transfer Type: To and from  Toilet Transfer to: Standard toilet (with use of grab bar)  Toilet Transfer Technique: Ambulating  Toilet Transfers: Contact guard    Functional Mobility:  Functional Mobility  Functional Mobility Performed: Yes  Functional Mobility 1  Device 1: Rolling walker  Assistance 1: Minimum assistance  Comments 1: pt ambulated in room and out into hallway with min vc for increased safety  Functional Mobility 2  Device 2: No device  Assistance 2: Minimum assistance  Comments 2: pt ambulated in room and out into hallway and in/out of bathroom. pt furniture walking at times but not LOB noted      Outcome Measures:[facility] Daily Activity  Putting on and taking off regular lower body clothing: A little  Bathing (including washing, rinsing, drying): A little  Putting on and taking off regular upper body clothing: A little  Toileting, which includes using toilet, bedpan or urinal: A little  Taking care of personal grooming such as brushing teeth: None  Eating Meals: None  Daily Activity - Total Score: 20        Education Documentation  ADL Training, taught by SAM Francis at 5/21/2025  3:01 PM.  Learner: Patient  Readiness: Acceptance  Method: Explanation  Response: Verbalizes Understanding    Education Comments  No comments found.             Goals:  Encounter Problems       Encounter Problems (Active)       OT Goals       Increase bed mobility & functional transfers to/from bed, chair & commode to CGA with DME for safety  (Progressing)       Start:  05/20/25    Expected End:  06/03/25            Increase UE bathing/dressing/grooming to SBA and LE bathing/dressing to mod assist with adaptive equipment as needed.  (Progressing)       Start:  05/20/25     Expected End:  06/03/25            Increase dynamic stand balance to CGA with UE support to promote increased independence with ADL & functional transfers  (Progressing)       Start:  05/20/25    Expected End:  06/03/25

## 2025-05-22 LAB
ATRIAL RATE: 67 BPM
ATRIAL RATE: 81 BPM
P AXIS: -13 DEGREES
P AXIS: 57 DEGREES
P OFFSET: 176 MS
P OFFSET: 201 MS
P ONSET: 142 MS
P ONSET: 143 MS
PR INTERVAL: 156 MS
PR INTERVAL: 158 MS
Q ONSET: 221 MS
Q ONSET: 221 MS
QRS COUNT: 11 BEATS
QRS COUNT: 13 BEATS
QRS DURATION: 82 MS
QRS DURATION: 82 MS
QT INTERVAL: 422 MS
QT INTERVAL: 478 MS
QTC CALCULATION(BAZETT): 490 MS
QTC CALCULATION(BAZETT): 505 MS
QTC FREDERICIA: 466 MS
QTC FREDERICIA: 496 MS
R AXIS: 41 DEGREES
R AXIS: 46 DEGREES
T AXIS: 139 DEGREES
T AXIS: 181 DEGREES
T OFFSET: 432 MS
T OFFSET: 460 MS
VENTRICULAR RATE: 67 BPM
VENTRICULAR RATE: 81 BPM

## 2025-05-23 LAB
BACTERIA BLD CULT: NORMAL
BACTERIA BLD CULT: NORMAL

## 2025-05-23 NOTE — DISCHARGE SUMMARY
Discharge Diagnosis  Acute congestive heart failure, unspecified heart failure type           Issues Requiring Follow-Up  CHF    Discharge Meds     Medication List      START taking these medications     furosemide 20 mg tablet; Commonly known as: Lasix; Take 0.5 tablets (10   mg) by mouth once daily. Do not fill before May 19, 2025.   losartan 100 mg tablet; Commonly known as: Cozaar; Take 1 tablet (100   mg) by mouth once daily.     CHANGE how you take these medications     metoprolol tartrate 100 mg tablet; Commonly known as: Lopressor; Take 1   tablet (100 mg) by mouth 2 times a day.; What changed: medication   strength, how much to take     CONTINUE taking these medications     amLODIPine 10 mg tablet; Commonly known as: Norvasc; Take 1 tablet (10   mg) by mouth once daily.     STOP taking these medications     dorzolamide-timoloL 22.3-6.8 mg/mL ophthalmic solution; Commonly known   as: Cosopt   Rocklatan 0.02-0.005 % drops; Generic drug: netarsudiL-latanoprost   simvastatin 80 mg tablet; Commonly known as: Zocor   zolpidem 10 mg tablet; Commonly known as: Ambien       Test Results Pending At Discharge  Pending Labs       No current pending labs.            Hospital Course   New onset congestive heart failure  Shortness of breath  Elevated BNP  Elevated high-sensitivity troponins  Hypertensive urgency  - Cardiology consult, recommendations appreciated  - Chest x-ray shows patchy bilateral perihilar and basilar infiltrates and probable small effusions  - Initial troponin 89, delta ordered and pending.  Trend  - EKG, per ED physician, normal sinus rhythm with ventricular rate 86 bpm.  No acute ST elevations.  T wave inversions in the inferior leads.  Slight ST sloping in V4 through V6  - Patient hypertensive to 253/133 in the ED, states that she has been taking her home metoprolol every day, though did miss her dose this a.m.   -Given IV labetalol in the ED  - Continue home metoprolol, IV hydralazine as needed  -  Continue with IV Lasix 20mg daily, adjustments per attending/cardio, pt is diuretic naïve   - Echo added on  - I's&O's, daily weights, heart failure education     Elevated Cr  Anemia  - Unclear baseline, Cr 1.22 today  - Hgb 11.0 today  - Monitor with BMP/CBC     DVT Prophylaxis  - subQ heparin  - SCDs     5/15: Creatinine improved from 1.2 down to 1.1 with diuresis. Continue IV Lasix. Add losartan for blood pressure control.      5/16: Patient remains hypertensive. Increase losartan to 50mg. Continue amlodipine and metoprolol. Continue IV Lasix. Discharge home with [facility] once BP acceptable.     5/17: Creatinine increased from 1.3 up to 1.5.  Will hold IV Lasix.  Repeat chest x-ray shows improvement.  Patient is on room air.  Will check a renal artery ultrasound given refractory hypertension.  Increase metoprolol.     5/18: Patient stable on room air. Creatinine improved form 1.5 down to 1.4. Blood pressure better controlled. Patient discharged home.    Pertinent Physical Exam At Time of Discharge  Physical Exam  Constitutional:       Comments: Patient is asleep when I arrive, but easily arousable.  Alert and oriented x 3.   HENT:      Head: Normocephalic and atraumatic.      Nose: Nose normal.      Mouth/Throat:      Mouth: Mucous membranes are dry.      Pharynx: Oropharynx is clear.   Eyes:      Extraocular Movements: Extraocular movements intact.      Conjunctiva/sclera: Conjunctivae normal.      Pupils: Pupils are equal, round, and reactive to light.   Cardiovascular:      Rate and Rhythm: Normal rate and regular rhythm.      Pulses: Normal pulses.   Pulmonary:      Effort: Pulmonary effort is normal.      Breath sounds: Normal breath sounds. No wheezing or rales.   Abdominal:      General: Abdomen is flat. Bowel sounds are normal.      Palpations: Abdomen is soft.      Tenderness: There is no abdominal tenderness.   Musculoskeletal:         General: Normal range of motion.      Right lower leg: Edema (1+)  present.      Left lower leg: Edema (1+) present.   Skin:     General: Skin is warm and dry.   Neurological:      General: No focal deficit present.      Mental Status: She is alert and oriented to person, place, and time.   Psychiatric:         Mood and Affect: Mood normal.         Behavior: Behavior normal.         Thought Content: Thought content normal.     Outpatient Follow-Up  Future Appointments   Date Time Provider Department Center   5/25/2025 To Be Determined Lashae Lindsey RN [facility]   5/26/2025 To Be Determined Shirley Macario, PT [facility]   5/26/2025 To Be Determined Rogers Bhandari OT [facility]         Johny Winter, DO

## 2025-05-23 NOTE — DOCUMENTATION CLARIFICATION NOTE
"    PATIENT:               OLIVIA EAST  ACCT #:                  [id number]  MRN:                       [id number]  :                       1924  ADMIT DATE:       2025 11:09 AM  DISCH DATE:        2025 5:52 PM  RESPONDING PROVIDER #:        [id number]          PROVIDER RESPONSE TEXT:    Metabolic Encephalopathy 2/2 aspiration pneumonia and hypoxia with acute respiratory failure    CDI QUERY TEXT:    Clarification        Instruction:    Based on your assessment of the patient and the clinical information, please provide the requested documentation by clicking on the appropriate radio button and enter any additional information if prompted.    Question: Please further clarify the type of Encephalopathy as    When answering this query, please exercise your independent professional judgment. The fact that a question is being asked, does not imply that any particular answer is desired or expected.    The patient's clinical indicators include:  Clinical Information:  100 yr old female presented with  AMS/decreased responsiveness/hypoxia.    Clinical Indicators:  Vital Signs:  2025,  1115:  T 35.8, HR 71, RR 18, /96, POx 91% on NRB    2025 ED Provider Note:  \"Patient presents with Altered Mental Status...Patient will follow some commands.  Patient does not answer any questions...  Physical Exam: Neurological: Patient opened eyes on command but did not follow any other commands.  Unable to do initial neuroexam as patient was not doing any spontaneous movement...was concerned that she may have aspiration pneumonia.  Therefore she was placed on oxygen and is currently on Airvo. ...[place] Coma Scale Score: 9\"      2025 H&P:  \"Aspiration pneumonia...Hypoxia...Encephalopathy, improving...\"      Treatment:  -  supplemental O2, : 60% Airvo, :  1 LO2 n/c  -  IV Zosyn  -  IV azithromycin    Risk Factors:  Acute hypoxic respiratory failure, Aspiration pneumonia  Options provided:  -- " Metabolic Encephalopathy 2/2 aspiration pneumonia and hypoxia with acute respiratory failure  -- Other - I will add my own diagnosis  -- Refer to Clinical Documentation Reviewer    Query created by: Chana Shah on 5/21/2025 10:32 AM      Electronically signed by:  FREDERIC MCALLISTER MD 5/23/2025 4:16 PM

## 2025-05-25 ENCOUNTER — HOME CARE VISIT (OUTPATIENT)
Dept: HOME HEALTH SERVICES | Facility: HOME HEALTH | Age: OVER 89
End: 2025-05-25

## 2025-05-26 ENCOUNTER — HOME CARE VISIT (OUTPATIENT)
Dept: HOME HEALTH SERVICES | Facility: HOME HEALTH | Age: OVER 89
End: 2025-05-26

## 2025-05-28 LAB
ATRIAL RATE: 67 BPM
P AXIS: -13 DEGREES
P OFFSET: 201 MS
P ONSET: 142 MS
PR INTERVAL: 158 MS
Q ONSET: 221 MS
QRS COUNT: 11 BEATS
QRS DURATION: 82 MS
QT INTERVAL: 478 MS
QTC CALCULATION(BAZETT): 505 MS
QTC FREDERICIA: 496 MS
R AXIS: 46 DEGREES
T AXIS: 139 DEGREES
T OFFSET: 460 MS
VENTRICULAR RATE: 67 BPM

## 2025-06-04 LAB
ATRIAL RATE: 76 BPM
P AXIS: 68 DEGREES
P OFFSET: 199 MS
P ONSET: 152 MS
PR INTERVAL: 140 MS
Q ONSET: 222 MS
QRS COUNT: 12 BEATS
QRS DURATION: 76 MS
QT INTERVAL: 438 MS
QTC CALCULATION(BAZETT): 492 MS
QTC FREDERICIA: 473 MS
R AXIS: 49 DEGREES
T AXIS: 161 DEGREES
T OFFSET: 441 MS
VENTRICULAR RATE: 76 BPM

## 2025-06-04 PROCEDURE — 93005 ELECTROCARDIOGRAM TRACING: CPT
